# Patient Record
Sex: FEMALE | Race: OTHER | Employment: UNEMPLOYED | URBAN - METROPOLITAN AREA
[De-identification: names, ages, dates, MRNs, and addresses within clinical notes are randomized per-mention and may not be internally consistent; named-entity substitution may affect disease eponyms.]

---

## 2020-10-27 ENCOUNTER — TELEPHONE (OUTPATIENT)
Dept: FAMILY MEDICINE CLINIC | Age: 30
End: 2020-10-27

## 2020-10-27 NOTE — TELEPHONE ENCOUNTER
Patient called in the office wanting a appointment for pregnancy. LMP: 07/12/20. Patient is a NP here at the office, scheduled first telemed visit with  11/3 @ 9:00am   Patient denied any covid symtoms.

## 2020-11-03 ENCOUNTER — VIRTUAL VISIT (OUTPATIENT)
Dept: FAMILY MEDICINE CLINIC | Age: 30
End: 2020-11-03

## 2020-11-03 DIAGNOSIS — R35.0 URINARY FREQUENCY: ICD-10-CM

## 2020-11-03 DIAGNOSIS — N92.6 MISSED MENSES: Primary | ICD-10-CM

## 2020-11-03 PROBLEM — Z34.90 PREGNANCY: Status: ACTIVE | Noted: 2020-11-03

## 2020-11-03 PROCEDURE — 99213 OFFICE O/P EST LOW 20 MIN: CPT | Performed by: STUDENT IN AN ORGANIZED HEALTH CARE EDUCATION/TRAINING PROGRAM

## 2020-11-03 NOTE — PROGRESS NOTES
Benita Goss  31 y.o. female  1990  [id number]  [facility] [address]  [id number]   [address]:    Telemedicine Progress Note  Pratibha Barrera MD       Encounter Date and Time: November 3, 2020 at 8:48 AM    Consent:  She and/or the health care decision maker is aware that that she may receive a bill for this telephone service, depending on her insurance coverage, and has provided verbal consent to proceed: Yes    Chief Complaint   Patient presents with    Missed Menses     History of Present Illness   Benita Goss is a 31 y.o. female was evaluated by telephone from home, with the assistance of [employer] interpretor #[id number]    [facility] 2020    [id number]    G1: miscarriage at 5 weeks in   G2: miscarriage at 9 weeks,  (per chart review)  G3: LTFI via , 2015 at [facility] no additional interventions based on previous miscarriage history. Delivered at [facility]  G4: current    No hx of HTN, preE, DM, GDM    Denies vaginal bleeding, abdominal pain but states she has a feeling of \"pressure in the vagina with a feeling like you have when needing to urinate frequently\"; denies pain, burning with urinating    Has noticed a change in her vaginal discharge - more yellow and thicker with a mild, foul smell since being pregnant    Review of Systems   Review of Systems   Gastrointestinal: Positive for nausea. Negative for abdominal pain and vomiting. Genitourinary: Positive for frequency. Negative for dysuria, hematuria and urgency.        Patient Screening for COVID-19:     1) Patient denies complaints of shortness of breath or difficulty breathing, sore throat, chills, fatigue, muscle aches, loss of taste or smell, or GI symptoms(nausea, vomiting or diarrhea): Yes    2) Patient denies complaints of cough or fever over 100F: Yes    3) Patient denies leaving the country in the past 14 days or any other recent travel: Yes    4) Patient denies being exposed to anyone with COVID-19 and has not been around any one that has been sick with COVID-19 like symptoms as listed above: Yes     5) Patient informed to wear mask when arriving for appointment: Yes      Vitals/Objective:   General: Patient speaking in complete sentences without effort. Normal speech and cooperative. Due to this being a Virtual Check-in/Telephone evaluation, many elements of the physical examination are unable to be assessed. Assessment and Plan:   32 yo [id number] at 20LE4C by certain LMP of 2/63/1686 [facility] 4/18/2021). Sx discussed today c/f UTI vs BV/Trich/Yeast so scheduled for 11/4/2020 8am for acute visit and chart forwarded to Sonja Sever, LPN for scheduling IPN visit. 1. Missed menses  - Continue with PNV  - initial OB appt TBD  - dating with M for anatomy scan vs clinic US    2. Urinary frequency  - scheduled for acute care within 24 hours for UA, Pelvic    Time spent in direct conversation with the patient to include medical condition(s) discussed, assessment and treatment plan:       We discussed the expected course, resolution and complications of the diagnosis(es) in detail. Medication risks, benefits, costs, interactions, and alternatives were discussed as indicated. I advised her to contact the office if her condition worsens, changes or fails to improve as anticipated. She expressed understanding with the diagnosis(es) and plan. Patient understands that this encounter was a temporary measure, and the importance of further follow up and examination was emphasized. Patient verbalized understanding. Patient informed to follow up: Follow-up and Dispositions  ·   Return in about 1 day (around 11/4/2020), or 8am for UTI/Pelvic exam.         Electronically Signed: Cindy Sosa MD    Providers location when delivering service: home    CPT Codes 63433-82854 for Established Patients may apply to this Telehealth Visit. POS code: 18.   Modifier GT      Pursuant to the emergency declaration under the [facility], 5376 waiver authority and the [employer] Act, this Virtual  Visit was conducted, with patient's consent, to reduce the patient's risk of exposure to COVID-19 and provide continuity of care for an established patient. Services were provided through a video synchronous discussion virtually to substitute for in-person clinic visit. History   Patients past medical, surgical and family histories were reviewed and updated. No past medical history on file. Past Surgical History:   Procedure Laterality Date    BREAST SURGERY PROCEDURE UNLISTED      HX CHOLECYSTECTOMY       No family history on file.   Social History     Socioeconomic History    Marital status: SINGLE     Spouse name: Not on file    Number of children: Not on file    Years of education: Not on file    Highest education level: Not on file   Occupational History    Not on file   Social Needs    Financial resource strain: Not on file    Food insecurity     Worry: Not on file     Inability: Not on file    Transportation needs     Medical: Not on file     Non-medical: Not on file   Tobacco Use    Smoking status: Never Smoker   Substance and Sexual Activity    Alcohol use: Yes     Comment: social    Drug use: Not on file    Sexual activity: Not on file   Lifestyle    Physical activity     Days per week: Not on file     Minutes per session: Not on file    Stress: Not on file   Relationships    Social connections     Talks on phone: Not on file     Gets together: Not on file     Attends Jewish service: Not on file     Active member of club or organization: Not on file     Attends meetings of clubs or organizations: Not on file     Relationship status: Not on file    Intimate partner violence     Fear of current or ex partner: Not on file     Emotionally abused: Not on file     Physically abused: Not on file     Forced sexual activity: Not on file   Other Topics Concern    Not on file   Social History Narrative    Not on file     Patient Active Problem List   Diagnosis Code    Pregnancy Z34.90          Current Medications/Allergies   Medications and Allergies reviewed:      No Known Allergies

## 2020-11-04 ENCOUNTER — HOSPITAL ENCOUNTER (OUTPATIENT)
Dept: LAB | Age: 30
Discharge: HOME | End: 2020-11-04

## 2020-11-04 ENCOUNTER — OFFICE VISIT (OUTPATIENT)
Dept: FAMILY MEDICINE CLINIC | Age: 30
End: 2020-11-04

## 2020-11-04 VITALS
TEMPERATURE: 98.1 F | SYSTOLIC BLOOD PRESSURE: 107 MMHG | BODY MASS INDEX: 22.37 KG/M2 | HEART RATE: 78 BPM | WEIGHT: 138.6 LBS | OXYGEN SATURATION: 99 % | DIASTOLIC BLOOD PRESSURE: 68 MMHG

## 2020-11-04 DIAGNOSIS — B96.89 BACTERIAL VAGINOSIS IN PREGNANCY: Primary | ICD-10-CM

## 2020-11-04 DIAGNOSIS — R35.0 URINARY FREQUENCY: ICD-10-CM

## 2020-11-04 DIAGNOSIS — Z3A.16 16 WEEKS GESTATION OF PREGNANCY: ICD-10-CM

## 2020-11-04 DIAGNOSIS — O23.599 BACTERIAL VAGINOSIS IN PREGNANCY: Primary | ICD-10-CM

## 2020-11-04 LAB
BILIRUB UR QL STRIP: NEGATIVE
GLUCOSE UR-MCNC: NEGATIVE MG/DL
KETONES P FAST UR STRIP-MCNC: NEGATIVE MG/DL
PH UR STRIP: 7.5 [PH] (ref 4.6–8)
PROT UR QL STRIP: NEGATIVE
SP GR UR STRIP: 1.01 (ref 1–1.03)
UA UROBILINOGEN AMB POC: NORMAL (ref 0.2–1)
URINALYSIS CLARITY POC: CLEAR
URINALYSIS COLOR POC: YELLOW
URINE BLOOD POC: NEGATIVE
URINE LEUKOCYTES POC: NORMAL
URINE NITRITES POC: NEGATIVE

## 2020-11-04 PROCEDURE — 90471 IMMUNIZATION ADMIN: CPT

## 2020-11-04 PROCEDURE — 81003 URINALYSIS AUTO W/O SCOPE: CPT | Performed by: STUDENT IN AN ORGANIZED HEALTH CARE EDUCATION/TRAINING PROGRAM

## 2020-11-04 PROCEDURE — 87624 HPV HI-RISK TYP POOLED RSLT: CPT

## 2020-11-04 PROCEDURE — 99214 OFFICE O/P EST MOD 30 MIN: CPT | Performed by: STUDENT IN AN ORGANIZED HEALTH CARE EDUCATION/TRAINING PROGRAM

## 2020-11-04 PROCEDURE — 87661 TRICHOMONAS VAGINALIS AMPLIF: CPT

## 2020-11-04 PROCEDURE — 90686 IIV4 VACC NO PRSV 0.5 ML IM: CPT

## 2020-11-04 PROCEDURE — 88175 CYTOPATH C/V AUTO FLUID REDO: CPT

## 2020-11-04 RX ORDER — METRONIDAZOLE 7.5 MG/G
1 GEL VAGINAL
Qty: 25 G | Refills: 0 | Status: SHIPPED | OUTPATIENT
Start: 2020-11-04 | End: 2020-11-09

## 2020-11-04 NOTE — PROGRESS NOTES
Chief Complaint   Patient presents with    Pelvic Pain     vaginal discharge [id number]. yellowish color. +Odor. Some pressure when urinating. Visit Vitals  /68 (BP 1 Location: Left arm, BP Patient Position: Sitting)   Pulse 78   Temp 98.1 °F (36.7 °C) (Temporal)   Wt 138 lb 9.6 oz (62.9 kg)   SpO2 99%   BMI 22.37 kg/m²     1. Have you been to the ER, urgent care clinic since your last visit? Hospitalized since your last visit? No    2. Have you seen or consulted any other health care providers outside of the [address] [place] [address] since your last visit? Include any pap smears or colon screening.  No

## 2020-11-04 NOTE — PATIENT INSTRUCTIONS
Semanas 14 a 18 de vang embarazo: Instrucciones de cuidado Weeks 14 to 18 of Your Pregnancy: Care Instructions Instrucciones de cuidado Ruddy Dean, es posible que se le empiece a notar que está [place]. También podría observar algunos cambios en la piel, mesha picazón en algunas zonas de las kathryn de las destiny o acné en la hyun. Summer Handler, vang bebé puede orinar y noemi primeras heces (meconio) comienzan a acumularse en el intestino. Georgette Decree a crecerle el lana en la randy. En vang próxima visita, [employer] 18 y 21, vang médico podría hacerle tim ecografía. La prueba le permite al médico verificar si hay ciertos problemas. Vang médico también puede determinar el sexo de vang bebé. Bernie es un buen momento para pensar si Ebb Espinoza si vang bebé es Dorethea Door. Hable con vang médico acerca de ponerse la vacuna contra la gripe para ayudar a mantenerse angel ruddy el embarazo. Con el transcurrir del [facility] [place], es común sentirse preocupada o ansiosa. Vang cuerpo está [employer] Y usted está pensando en vickie a cyrus, en la mirta de vang bebé y en convertirse en madre. Puede aprender a sobrellevar la ansiedad y el estrés que siente. La atención de seguimiento es tim parte clave de vang tratamiento y seguridad. Asegúrese de hacer y acudir a todas las citas, y llame a vang médico si está teniendo problemas. También es tim buena idea saber los resultados de noemi exámenes y mantener tim lista de los medicamentos que kathie. Cómo puede cuidarse en el hogar? Reduzca el estrés 
  · Pida ayuda para cocinar y hacer los quehaceres domésticos.  
  · Entienda quién o qué le provoca estrés. Evite a estas personas o situaciones tanto mesha le sea posible.  
  · Relájese todos los días. Tomarse descansos de 10 a 15 minutos puede hacerle sentir tim gran diferencia.  Camine, escuche música o tome un baño tibio.  
  · Alston clases de yoga o educación prenatal para aprender técnicas de relajación. También puede comprar un disco compacto de relajación.  
  · Deven tim lista de noemi temores acerca de tener el bebé y ser [place]. Comparta la lista con alguien de vang confianza. Hope [employer] inquietudes son verdaderamente pequeñas, y trate de deshacerse de ellas. Ejercicio 
  · Si no hizo mucho ejercicio antes del embarazo, comience poco a poco. Lo mejor es caminar. Regule vang ritmo y deven un poco más cada día.  
  · La caminata rápida, el trote lento, los ejercicios aeróbicos de bajo impacto, los ejercicios aeróbicos en el agua y el yoga son [place] opciones. Algunos deportes, mesha el buceo, la equitación, el esquí Teresa, la gimnasia y el esquí acuático no son Grace Pollack idea.  
  · Trate de hacer por lo menos 2½ horas de ejercicio moderado a la semana, mesha, por ejemplo, tim caminata rápida. Anice Marc de hacer esto es estar activo 30 minutos al día, por lo menos 5 días de la [place]. Está brandan estar activo en bloques de 10 minutos o más goyo el día y la semana.  
  · Use ropa holgada. Use zapatos y un sostén que le proporcionen un buen soporte.  
  · Elta [place] de calentamiento y enfriamiento para comenzar y finalizar noemi ejercicios.  
  · Si desea usar pesas, asegúrese de que chris livianas. Estas reducen la tensión en las articulaciones. Manténgase en el peso ideal para usted 
  · Los expertos recomiendan el aumento de 1 hermann (medio kilo) al mes goyo los 3 primeros meses del embarazo.  
  · También recomiendan aumentar 1 hermann a la [employer] 6 últimos meses del [facility], para aumentar de 25 a 35 libras (11 a 16 kg) en total.  
  · Si está por debajo del peso recomendable para usted, necesitará aumentar más, de 28 a 40 libras (13 a 18 kg) aproximadamente.  
  · Si tiene sobrepeso, quizás no deba aumentar tanto de [place], de 15 a 25 libras (7 a 11 kg) aproximadamente.  
  · Si está subiendo de Ray Stein, use vang sentido común.  Cheikh Spangler ejercicio todos los días, y [employer], la comida rápida y [place]. Ghislaine Hackett, josetas y verduras.  
  · Si va a tener gemelos o más bebés, es posible que torres médico la remita a un dietista. Dónde puede encontrar más información en inglés? Gaurang Branham a http://www.mack.com/ Mansi Gutierrez Y187 en la búsqueda para aprender más acerca de \"Semanas 14 a 18 de torres embarazo: Instrucciones de cuidado. \" Revisado: 11 febrero, 2020               Versión del contenido: 12.6 © 3485-8625 Healthwise, Incorporated. Las instrucciones de cuidado fueron adaptadas bajo licencia por Good [employer] Connections (which disclaims liability or warranty for this information). Si usted tiene [place] afección médica o sobre estas instrucciones, siempre pregunte a torres profesional de mirta. Healthwise, Incorporated niega toda garantía o responsabilidad por torres uso de esta información. Aprenda cuándo llamar a torres médico goyo el embarazo ([address] 20 semanas) Learning About When to Call Your Doctor During Pregnancy (Up to 20 Weeks) Instrucciones de cuidado Es normal que tenga inquietudes acerca de lo que podría ser un problema goyo el [facility]. Aunque la mayoría de las mujeres embarazadas no tienen ningún problema grave, es importante saber cuándo llamar a torres médico si tiene determinados síntomas. Estas son algunas sugerencias generales. Torres médico puede darle más información sobre cuándo llamar. Cuándo llamar a torres médico ([address] 20 semanas) Llame al 911 en cualquier momento que sospeche que puede necesitar atención de [place]. Por ejemplo, llame si: 
· Se desmayó (perdió el conocimiento). Llame a torres médico ahora mismo o busque atención médica inmediata si: · Tiene fiebre. · Tiene sangrado vaginal. 
· Está mareada o aturdida, o siente que puede desmayarse. · Tiene síntomas de tim infección del tracto urinario. Estos pueden incluir: ? Dolor o ardor al orinar. ? Necesidad de orinar con frecuencia sin poder Excell Patricia. ? Dolor en el flanco, que se encuentra manjit debajo de la caja torácica y [place] de la cintura en un lado de la espalda. ? [employer]. · Tiene dolor abdominal. 
· Hermila que está teniendo contracciones. · Tiene tim pérdida repentina de líquido por la vagina. Preste especial atención a los cambios en vang mirta y asegúrese de comunicarse con vang médico si: · Tiene flujo vaginal con un olor desagradable. · Tiene otras inquietudes acerca de vang embarazo. La atención de seguimiento es tim parte clave de vang tratamiento y seguridad. Asegúrese de hacer y acudir a todas las citas, y llame a vang médico si está teniendo problemas. También es tim buena idea saber los resultados de noemi exámenes y mantener tim lista de los medicamentos que kathie. Dónde puede encontrar más información en inglés? Kylah Beyer a http://www.gray.com/ Renato O532 en la búsqueda para aprender más acerca de \"Aprenda cuándo llamar a vang médico goyo el embarazo ([address] 20 semanas). \" 
Revisado: 11 febrero, 2020               Versión del contenido: 12.6 © 5541-9558 Healthwise, Incorporated. Las instrucciones de cuidado fueron adaptadas bajo licencia por Good [employer] Connections (which disclaims liability or warranty for this information). Si usted tiene [place] afección médica o sobre estas instrucciones, siempre pregunte a vang profesional de mirta. Healthwise, Incorporated niega toda garantía o responsabilidad por vang uso de esta información. Vaginosis bacteriana: Instrucciones de cuidado Bacterial Vaginosis: Care Instructions Instrucciones de cuidado La vaginosis bacteriana es un tipo de infección vaginal. Es causada por el crecimiento excesivo de ciertas bacterias que se encuentran normalmente en la vagina.  Entre los síntomas se incluyen comezón, hinchazón, dolor al orinar o Elizebeth Brush, y flujo grisáceo o amarillento con olor a pescado. No se considera tim infección que se transmita por contacto sexual. 
Aunque los síntomas pueden ser molestos e incómodos, la vaginosis bacteriana no suele causar otros problemas de [place]. Sin embargo, puede causar complicaciones si la tiene mientras está [place]. Si brandan la infección podría desaparecer por sí [place], la mayoría de los médicos utilizan antibióticos para vang tratamiento. Es posible que le hayan recetado pastillas o cremas vaginales. Con tratamiento, la vaginosis bacteriana suele desaparecer al cabo de 5 a 7 días. La atención de seguimiento es tim parte clave de vang tratamiento y seguridad. Asegúrese de hacer y acudir a todas las citas, y llame a vang médico si está teniendo problemas. También es tim buena idea saber los resultados de noemi exámenes y mantener tim lista de los medicamentos que kathie. Cómo puede cuidarse en el hogar? · [address] No deje de tomarlos por el hecho de sentirse mejor. Debe perla todos los antibióticos hasta terminarlos. · Si está tomando metronidazol (Flagyl), no coma ni dario nada que contenga alcohol. · Siga utilizando los medicamentos aunque comience el período menstrual. Utilice toallas sanitarias en lugar de tampones goyo el tiempo que utilice la crema vaginal o supositorios. Los tampones pueden absorber el medicamento. · Use ropa holgada de algodón. No utilice nylon ni otros materiales que conserven el calor corporal y la humedad cerca de la piel. · No se rasque. Alivie la comezón con tim compresa fría o un baño frío. · No se lave la hoa vaginal más de tim vez al día. Use solo agua corriente o un Brie Beat y sin perfume. No use lavados vaginales. Cuándo debe pedir ayuda? Preste especial atención a los cambios en vang mirta y asegúrese de comunicarse con vang médico si: 
  · Tiene sangrado vaginal inesperado.  
  · Tiene fiebre.   · Tiene mayor dolor o dolor nuevo en la vagina o la pelvis.  
  · No mejora después de 1 semana.  
  · Charito síntomas regresan después de que termina charito medicamentos. Dónde puede encontrar más información en inglés? Magno Sumnerve a http://www.gray.Ippies/ Renato V209 en la búsqueda para aprender más acerca de \"Vaginosis bacteriana: Instrucciones de cuidado. \" Revisado: 8 noviembre, 2019               Versión del contenido: 12.6 © 2460-2624 Rayn, [employer]. Las instrucciones de cuidado fueron adaptadas bajo licencia por Good Help Connections (which disclaims liability or warranty for this information). Si usted tiene [place] afección médica o sobre estas instrucciones, siempre pregunte a vang profesional de mirta. Rayn, [employer] niega toda garantía o responsabilidad por vang uso de esta información.

## 2020-11-04 NOTE — PROGRESS NOTES
Chief Complaint: urinary pain and vaginal discharge with odor  Source: self    Subjective  Dangelo Giordano is an 31 y.o. female who presents for pain with urination and increased urinary frequency. She also notes having increased yellow vaginal discharge with foul odor. She denies fever, chills, nausea, vomiting, diarrhea. She is 16wk3d pregnant by LMP      Allergies - reviewed:   No Known Allergies      Medications - reviewed:   Current Outpatient Medications   Medication Sig    prenatal multivit-ca-min-fe-fa tab Take  by mouth.  metroNIDAZOLE (METROGEL) 0.75 % gel Insert 5 g into vagina nightly for 5 days. No current facility-administered medications for this visit. Past Medical History - reviewed:  No past medical history on file.       Past Surgical History - reviewed:   Past Surgical History:   Procedure Laterality Date    BREAST SURGERY PROCEDURE UNLISTED      HX CHOLECYSTECTOMY           Social History - reviewed:  Social History     Socioeconomic History    Marital status: SINGLE     Spouse name: Not on file    Number of children: Not on file    Years of education: Not on file    Highest education level: Not on file   Occupational History    Not on file   Social Needs    Financial resource strain: Not on file    Food insecurity     Worry: Not on file     Inability: Not on file   [employer] needs     Medical: Not on file     Non-medical: Not on file   Tobacco Use    Smoking status: Never Smoker    Smokeless tobacco: Never Used   Substance and Sexual Activity    Alcohol use: Never     Frequency: Never     Comment: social    Drug use: Never    Sexual activity: Yes     Partners: Female     Birth control/protection: Pill   Lifestyle    Physical activity     Days per week: Not on file     Minutes per session: Not on file    Stress: Not on file   Relationships    Social connections     Talks on phone: Not on file     Gets together: Not on file     Attends Sabianism service: Not on file     Active member of club or organization: Not on file     Attends meetings of clubs or organizations: Not on file     Relationship status: Not on file    Intimate partner violence     Fear of current or ex partner: Not on file     Emotionally abused: Not on file     Physically abused: Not on file     Forced sexual activity: Not on file   Other Topics Concern    Not on file   Social History Narrative    Not on file         Family History - reviewed:  Family History   Problem Relation Age of Onset    Diabetes Mother          Immunizations - reviewed: There is no immunization history for the selected administration types on file for this patient. Flu: Today    Review of Systems   Constitutional: Negative for chills and fever. Respiratory: Negative for cough and shortness of breath. Cardiovascular: Negative for palpitations. Gastrointestinal: Negative for abdominal pain, constipation, diarrhea, nausea and vomiting. Genitourinary: Positive for dysuria and frequency. + vaginal discharge         Physical Exam  Visit Vitals  /68 (BP 1 Location: Left arm, BP Patient Position: Sitting)   Pulse 78   Temp 98.1 °F (36.7 °C) (Temporal)   Wt 138 lb 9.6 oz (62.9 kg)   LMP 07/12/2020   SpO2 99%   BMI 22.37 kg/m²       Physical Exam  Exam conducted with a chaperone present. Constitutional:       Appearance: Normal appearance. She is normal weight. Genitourinary:     General: Normal vulva. Exam position: Lithotomy position. Pubic Area: No rash. Labia:         Right: No rash, tenderness, lesion or injury. Left: No rash, tenderness, lesion or injury. Comments: Cervix with scant yellow discharge, negative whiff test. Minimal bleeding when pap collected. Transformation zone easily identified. Cervix closed. No CMT. Uterus c/w 16wk gestation on bimanual exam  Neurological:      Mental Status: She is alert.          Recent Results (from the past 12 hour(s))   AMB POC URINALYSIS DIP STICK AUTO W/O MICRO    Collection Time: 11/04/20  8:38 AM   Result Value Ref Range    Color (UA POC) Yellow     Clarity (UA POC) Clear     Glucose (UA POC) Negative Negative    Bilirubin (UA POC) Negative Negative    Ketones (UA POC) Negative Negative    Specific gravity (UA POC) 1.015 1.001 - 1.035    Blood (UA POC) Negative Negative    pH (UA POC) 7.5 4.6 - 8.0    Protein (UA POC) Negative Negative    Urobilinogen (UA POC) 0.2 mg/dL 0.2 - 1    Nitrites (UA POC) Negative Negative    Leukocyte esterase (UA POC) Trace Negative     Wet Prep: + clue cells    Assessment/Plan    ICD-10-CM ICD-9-CM    1. Bacterial vaginosis in pregnancy  O23.599 648.90 metroNIDAZOLE (METROGEL) 0.75 % gel    B96.89 616.10    2. Urinary frequency  R35.0 788.41 AMB POC URINALYSIS DIP STICK AUTO W/O MICRO      CULTURE, URINE      CULTURE, URINE   3. 16 weeks gestation of pregnancy  Z3A.16 V22.2 PAP IG, CT-NG-TV, APTIMA HPV AND RFX 64/02,54([id number])      INFLUENZA VIRUS VAC QUAD,SPLIT,PRESV FREE SYRINGE IM       Taya Ortiz is an 31 y.o. female at 13wk3d presenting for symptoms of UTI vs pelvic infection. UA unremarkable however wet prep with clue cells so will treat empirically for BV given symptoms. G/C and PAP collected today so no need to repeat at initial prenatal. Has initial prenatal scheduled for 11/9/2020 and can followup on symptoms then. 1. Urinary frequency  - AMB POC URINALYSIS DIP STICK AUTO W/O MICRO  - CULTURE, URINE; Future  - CULTURE, URINE    2. 16 weeks gestation of pregnancy  - PAP IG, CT-NG-TV, APTIMA HPV AND RFX 29/70,76([id number]); Future  - INFLUENZA VIRUS VAC QUAD,SPLIT,PRESV FREE SYRINGE IM    3. Bacterial vaginosis in pregnancy  - metroNIDAZOLE (METROGEL) 0.75 % gel; Insert 5 g into vagina nightly for 5 days. Dispense: 25 g; Refill: 0      I have discussed the diagnosis with the patient and the intended plan as seen in the above orders.  Patient verbalized understanding of the plan and agrees with the plan. The patient has received an after-visit summary and questions were answered concerning future plans. I have discussed medication side effects and warnings with the patient as well. Informed patient to return to the office if new symptoms arise. Patient discussed with Dr. Shaheen Medina, supervising physician.     Josh Linda PGY2  Family Medicine Resident

## 2020-11-04 NOTE — Clinical Note
Just CC'ing you on chart for initial prenatal you have on 11/9. I did a pelvic and pap/g/c + UA/Cx (its negative) + flu shot was given this week so no need to repeat the pelvic. Treated her for BV but if you could see if her Sx have improved when you seen her Monday.

## 2020-11-05 LAB
BACTERIA SPEC CULT: NORMAL
SERVICE CMNT-IMP: NORMAL

## 2020-11-06 LAB
C TRACH RRNA SPEC QL NAA+PROBE: NEGATIVE
N GONORRHOEA RRNA SPEC QL NAA+PROBE: NEGATIVE
SPECIMEN SOURCE: NORMAL
T VAGINALIS RRNA SPEC QL NAA+PROBE: NEGATIVE

## 2020-11-06 NOTE — PROGRESS NOTES
[address] Medicine Residency Attending Addendum:  Dr. Ashwini Zuleta MD,  the patient and I were not physically present during this encounter. The resident and I are concurrently monitoring the patient care through appropriate telecommunication technology. I discussed the findings, assessment and plan with the resident and agree with the resident's findings and plan as documented in the resident's note.       Laura Leo MD

## 2020-11-09 ENCOUNTER — INITIAL PRENATAL (OUTPATIENT)
Dept: FAMILY MEDICINE CLINIC | Age: 30
End: 2020-11-09

## 2020-11-09 VITALS
BODY MASS INDEX: 21.79 KG/M2 | SYSTOLIC BLOOD PRESSURE: 111 MMHG | TEMPERATURE: 98.2 F | HEART RATE: 96 BPM | OXYGEN SATURATION: 96 % | HEIGHT: 66 IN | WEIGHT: 135.6 LBS | RESPIRATION RATE: 16 BRPM | DIASTOLIC BLOOD PRESSURE: 69 MMHG

## 2020-11-09 DIAGNOSIS — Z3A.17 17 WEEKS GESTATION OF PREGNANCY: Primary | ICD-10-CM

## 2020-11-09 LAB
BILIRUB UR QL STRIP: NEGATIVE
ERYTHROCYTE [DISTWIDTH] IN BLOOD BY AUTOMATED COUNT: 13.8 % (ref 11.5–14.5)
GLUCOSE UR-MCNC: NEGATIVE MG/DL
HCG URINE, QL. (POC): POSITIVE
HCT VFR BLD AUTO: 42.6 % (ref 35–47)
HGB BLD-MCNC: 14.5 G/DL (ref 11.5–16)
KETONES P FAST UR STRIP-MCNC: NEGATIVE MG/DL
MCH RBC QN AUTO: 29.5 PG (ref 26–34)
MCHC RBC AUTO-ENTMCNC: 34 G/DL (ref 30–36.5)
MCV RBC AUTO: 86.8 FL (ref 80–99)
NRBC # BLD: 0 K/UL (ref 0–0.01)
NRBC BLD-RTO: 0 PER 100 WBC
PH UR STRIP: 6 [PH] (ref 4.6–8)
PLATELET # BLD AUTO: 183 K/UL (ref 150–400)
PMV BLD AUTO: 11.6 FL (ref 8.9–12.9)
PROT UR QL STRIP: NORMAL
RBC # BLD AUTO: 4.91 M/UL (ref 3.8–5.2)
SP GR UR STRIP: 1.03 (ref 1–1.03)
UA UROBILINOGEN AMB POC: NORMAL (ref 0.2–1)
URINALYSIS CLARITY POC: NORMAL
URINALYSIS COLOR POC: YELLOW
URINE BLOOD POC: NEGATIVE
URINE LEUKOCYTES POC: NEGATIVE
URINE NITRITES POC: NEGATIVE
VALID INTERNAL CONTROL?: YES
WBC # BLD AUTO: 9.2 K/UL (ref 3.6–11)

## 2020-11-09 PROCEDURE — 81025 URINE PREGNANCY TEST: CPT | Performed by: STUDENT IN AN ORGANIZED HEALTH CARE EDUCATION/TRAINING PROGRAM

## 2020-11-09 PROCEDURE — 0502F SUBSEQUENT PRENATAL CARE: CPT | Performed by: STUDENT IN AN ORGANIZED HEALTH CARE EDUCATION/TRAINING PROGRAM

## 2020-11-09 NOTE — PROGRESS NOTES
Subjective:   Johanne Diaz is a 31 y.o.  at 17w2d with RAFY of Estimated Date of Delivery: 21 based on LMP, who is being seen today for her first initial obstetrical visit. This is a planned pregnancy. Father of baby present. Happy about pregnancy. LMP: 20. See flow sheet for OB history. History of GDM or DM? no  History of GHTN or HTN? no  History of pre-eclampsia? no  History of Genetic disorders? no  History of depression?: no   Taking prenatal vitamins? Yes    Patient being treated for BV starting 20. Patient states that symptoms have improved. Denies fever, chills, vomiting, vaginal bleeding, vaginal discharge, contractions. Allergies- reviewed:   No Known Allergies    Medications- reviewed:   Current Outpatient Medications   Medication Sig    prenatal multivit-ca-min-fe-fa tab Take  by mouth.  metroNIDAZOLE (METROGEL) 0.75 % gel Insert 5 g into vagina nightly for 5 days. No current facility-administered medications for this visit. Past Medical History- reviewed:  History reviewed. No pertinent past medical history.     Past Surgical History- reviewed:   Past Surgical History:   Procedure Laterality Date    BREAST SURGERY PROCEDURE UNLISTED      HX CHOLECYSTECTOMY         Social History- reviewed:  Social History     Socioeconomic History    Marital status: SINGLE     Spouse name: Not on file    Number of children: Not on file    Years of education: Not on file    Highest education level: Not on file   Occupational History    Not on file   Social Needs    Financial resource strain: Not on file    Food insecurity     Worry: Not on file     Inability: Not on file    Transportation needs     Medical: Not on file     Non-medical: Not on file   Tobacco Use    Smoking status: Never Smoker    Smokeless tobacco: Never Used   Substance and Sexual Activity    Alcohol use: Never     Frequency: Never     Comment: social    Drug use: Never    Sexual activity: Yes     Partners: Male     Birth control/protection: None   Lifestyle    Physical activity     Days per week: Not on file     Minutes per session: Not on file    Stress: Not on file   Relationships    Social connections     Talks on phone: Not on file     Gets together: Not on file     Attends [employer] service: Not on file     Active member of club or organization: Not on file     Attends meetings of clubs or organizations: Not on file     Relationship status: Not on file    Intimate partner violence     Fear of current or ex partner: Not on file     Emotionally abused: Not on file     Physically abused: Not on file     Forced sexual activity: Not on file   Other Topics Concern    Not on file   Social History Narrative    Not on file         Objective:     Visit Vitals  /69 (BP 1 Location: Left arm, BP Patient Position: Sitting)   Pulse 96   Temp 98.2 °F (36.8 °C) (Temporal)   Resp 16   Ht 5' 6\" (1.676 m)   Wt 135 lb 9.6 oz (61.5 kg)   LMP 07/12/2020   SpO2 96%   BMI 21.89 kg/m²       Labs:  Recent Results (from the past 12 hour(s))   AMB POC URINALYSIS DIP STICK AUTO W/O MICRO    Collection Time: 11/09/20 11:21 AM   Result Value Ref Range    Color (UA POC) Yellow     Clarity (UA POC) Slightly Cloudy     Glucose (UA POC) Negative Negative    Bilirubin (UA POC) Negative Negative    Ketones (UA POC) Negative Negative    Specific gravity (UA POC) 1.030 1.001 - 1.035    Blood (UA POC) Negative Negative    pH (UA POC) 6.0 4.6 - 8.0    Protein (UA POC) Trace Negative    Urobilinogen (UA POC) 0.2 mg/dL 0.2 - 1    Nitrites (UA POC) Negative Negative    Leukocyte esterase (UA POC) Negative Negative   AMB POC URINE PREGNANCY TEST, VISUAL COLOR COMPARISON    Collection Time: 11/09/20 11:21 AM   Result Value Ref Range    VALID INTERNAL CONTROL POC Yes     HCG urine, Ql. (POC) Positive Negative         Assessment and Plan:         ICD-10-CM ICD-9-CM    1. 17 weeks gestation of pregnancy  Z3A.17 V22.2 AMB POC URINALYSIS DIP STICK AUTO W/O MICRO      AMB POC URINE PREGNANCY TEST, VISUAL COLOR COMPARISON       31 y.o.  at 17w1d by LMP here for initial OB visit  - PNL: A+, GC/CT neg, ucx NG, Pap NILM HPV neg  - CBC, Hgb fractionation, Ab screen, rubella, VZV, HBsAg RPR, HIV collected   - Discussed recommended weight gain of 25-35lb  - Recommended prenatal vitamins  - Patient would like genetic screening. To be collected at next visit if not collected today. - Dating/Anatomy scan to be scheduled. Sandip Riley RN aware. - Follow up on 20    Orders Placed This Encounter    AMB POC URINALYSIS DIP STICK AUTO W/O MICRO    AMB POC URINE PREGNANCY TEST, VISUAL COLOR COMPARISON         I have discussed the diagnosis with the patient and the intended plan as seen in the above orders. The patient has received an after-visit summary and questions were answered concerning future plans. I have discussed medication side effects and warnings with the patient as well. Informed pt to return to the office or go to the ER if she experiences vaginal bleeding, vaginal discharge, leaking of fluid, pelvic cramping.       Pt seen and discussed with Dr. Jessenia Johns (attending physician)    Panda Wolfe DO  Family Medicine Resident

## 2020-11-09 NOTE — PROGRESS NOTES
Lucía Joseph is a 31 y.o. female    Chief Complaint   Patient presents with    Initial Prenatal Visit     Patient is coming in for her initial prenatal visit. She is 17 weeks and 1 day,. She said she took 3 at home pregnancy tests and the first one was negative but the last 2 were positive. She is not having vaginal bleeding or discharge. She is not hvaing contractions. She said she does feel the baby move. No other contractions. 1. Have you been to the ER, urgent care clinic since your last visit? Hospitalized since your last visit? No  M  2. Have you seen or consulted any other health care providers outside of the [address] [place] [address] since your last visit? Include any pap smears or colon screening. No      Visit Vitals  /69 (BP 1 Location: Left arm, BP Patient Position: Sitting)   Pulse 96   Temp 98.2 °F (36.8 °C) (Temporal)   Resp 16   Ht 5' 6\" (1.676 m)   Wt 135 lb 9.6 oz (61.5 kg)   SpO2 96%   BMI 21.89 kg/m²           Health Maintenance Due   Topic Date Due    DTaP/Tdap/Td series (1 - Tdap) 03/15/2010    PAP AKA CERVICAL CYTOLOGY  03/15/2010         Medication Reconciliation completed, changes noted.   Please  Update medication list.    Vaginal Bleeding: No  Vaginal discharge: No  Fetal movement: Yes  Contractions: No  Prenatal Vitamins: yes

## 2020-11-10 LAB
BLOOD BANK CMNT PATIENT-IMP: NORMAL
BLOOD GROUP ANTIBODIES SERPL: NORMAL
HBV SURFACE AG SER QL: 0.18 INDEX
HBV SURFACE AG SER QL: NEGATIVE
HIV 1+2 AB+HIV1 P24 AG SERPL QL IA: NONREACTIVE
HIV12 RESULT COMMENT, HHIVC: NORMAL
RPR SER QL: NONREACTIVE
RUBV IGG SER-IMP: REACTIVE
RUBV IGG SERPL IA-ACNC: >500 IU/ML

## 2020-11-11 LAB
DEPRECATED HGB OTHER BLD-IMP: 0 %
HGB A MFR BLD: 97.2 % (ref 96.4–98.8)
HGB A2 MFR BLD COLUMN CHROM: 2.8 % (ref 1.8–3.2)
HGB C MFR BLD: 0 %
HGB F MFR BLD: 0 % (ref 0–2)
HGB FRACT BLD-IMP: NORMAL
HGB S BLD QL SOLY: NEGATIVE
HGB S MFR BLD: 0 %
VZV IGG SER IA-ACNC: >4000 INDEX

## 2020-11-13 NOTE — PROGRESS NOTES
A+, GC/CT neg, Ab screen neg, Hgb frc wnl, VZV/Rubella immune, RPR/HIV NR, HbsAg neg. Pap NILM HPV neg,  CBC wnl.

## 2020-11-22 ENCOUNTER — DOCUMENTATION ONLY (OUTPATIENT)
Dept: FAMILY MEDICINE CLINIC | Age: 30
End: 2020-11-22

## 2020-12-02 ENCOUNTER — ROUTINE PRENATAL (OUTPATIENT)
Dept: FAMILY MEDICINE CLINIC | Age: 30
End: 2020-12-02

## 2020-12-02 DIAGNOSIS — R42 LIGHT-HEADEDNESS: ICD-10-CM

## 2020-12-02 DIAGNOSIS — Z3A.20 20 WEEKS GESTATION OF PREGNANCY: Primary | ICD-10-CM

## 2020-12-02 PROCEDURE — 0502F SUBSEQUENT PRENATAL CARE: CPT | Performed by: STUDENT IN AN ORGANIZED HEALTH CARE EDUCATION/TRAINING PROGRAM

## 2020-12-02 NOTE — PROGRESS NOTES
Isela Wilson  31 y.o. female  1990  [address] [place] [address] Drive [address]  [id number]    [phone] (home)      [address]:    [facility] Telephone Encounter  Prachi Rivera MD       Encounter Date: 12/2/2020 at 8:39 AM    Consent:  She and/or the health care decision maker is aware that this encounter will be billed as a routine OB appointment and that she may receive a bill for this telephone service, depending on her insurance coverage, and has provided verbal consent to proceed: Yes    Follow-up Prenatal     History of Present Illness    #[id number]    Contractions: no  LOF: no  Vaginal bleeding: no  Fetal movement (if >20 wk): yes    Pt complains of occasional light-headedness before lunchtime when she stands up. She denies CP, palpitations, and SOB. She has never lost consciousness. This has been present since the start of her second trimester. It is occasional and is not worsening. Pt has been drinking 2 16oz water bottles each day, and this is all she has been drinking. These symptoms resolve after lunch. She does not experience it in the morning or at night. Vitals/Objective:   General: Patient speaking in complete sentences without effort. Normal speech and cooperative. Due to this being a Virtual Check-in/Telephone evaluation, many elements of the physical examination are unable to be assessed. Assessment and Plan:   Isela Wilson is a 31 y.o. G4 [id number] @ 20w3d evaluated by telephone. 1. SIUP - A, Rh pos, Abs neg, HepB/GC/chlam neg, Pap NILM, HPV neg, Hgb fract nml, HIV/RPR NR, VZV/rubella imm, hgb 14.5  - Genetic screening: NIPT low risk, female   - 20 wk scan: scheduled for 12/7  - Next appt on 12/21  - Will need GTT at 24-28 weeks w/ repeat CBC    2. Occasional light-headedness - Discussed physiologic cardiovascular changes when becoming pregnant. Pt is not taking in very much fluid.  She is consuming about 4 cups of water daily.  - Asked pt to increase water intake, especially in the morning.  - Discussed ensuring that she is eating regularly. She may need to eat lunch earlier given it resolves after lunch. - If symptoms get worse or become more persistent, pt will need to come into the office sooner for further work up. - Discussed that if pt ever experiences episode of syncope, SOB, persistent palpitations, or CP, she should go to the hospital immediately        -Patient informed of her next appointment: 12/21/2020 (in person)  -Advised to come in sooner for any concerns  -Patient was reminded about social distancing and to avoid going into public when possible. Patient understands that this encounter was a temporary measure, and the importance of further follow up and examination was emphasized. Patient verbalized understanding. I affirm this is a Patient Initiated Episode with an Established Patient who has not had a related appointment within my department in the past 7 days or scheduled within the next 24 hours. Note: not billable if this call serves to triage the patient into an appointment for the relevant concern      Electronically Signed: Livier Warner MD  Providers location when delivering service: office      ICD-10-CM ICD-9-CM    1. 20 weeks gestation of pregnancy  Z3A.20 V22.2    2. Light-headedness  R42 780.4        Pursuant to the emergency declaration under the [facility] waiver authority and the [address] and [employer] Act, this Virtual  Visit was conducted, with patient's consent, to reduce the patient's risk of exposure to COVID-19 and provide continuity of care for an established patient. History   Patients past medical, surgical and family histories were personally reviewed and updated.   yes    Patient Active Problem List   Diagnosis Code    Pregnancy Z34.90              Current Medications/Allergies   Medications and Allergies reviewed:    Current Outpatient Medications   Medication Sig Dispense Refill    prenatal multivit-ca-min-fe-fa tab Take  by mouth.        No Known Allergies

## 2020-12-07 ENCOUNTER — HOSPITAL ENCOUNTER (OUTPATIENT)
Dept: PERINATAL CARE | Age: 30
Discharge: HOME | End: 2020-12-07
Attending: OBSTETRICS & GYNECOLOGY

## 2020-12-07 PROCEDURE — 76805 OB US >/= 14 WKS SNGL FETUS: CPT | Performed by: OBSTETRICS & GYNECOLOGY

## 2020-12-21 ENCOUNTER — ROUTINE PRENATAL (OUTPATIENT)
Dept: FAMILY MEDICINE CLINIC | Age: 30
End: 2020-12-21

## 2020-12-21 ENCOUNTER — TELEPHONE (OUTPATIENT)
Dept: FAMILY MEDICINE CLINIC | Age: 30
End: 2020-12-21

## 2020-12-21 VITALS
HEART RATE: 88 BPM | BODY MASS INDEX: 23.11 KG/M2 | WEIGHT: 143.8 LBS | TEMPERATURE: 97.9 F | OXYGEN SATURATION: 98 % | HEIGHT: 66 IN | SYSTOLIC BLOOD PRESSURE: 95 MMHG | DIASTOLIC BLOOD PRESSURE: 61 MMHG

## 2020-12-21 DIAGNOSIS — Z3A.20 20 WEEKS GESTATION OF PREGNANCY: Primary | ICD-10-CM

## 2020-12-21 LAB
BILIRUB UR QL STRIP: NEGATIVE
GLUCOSE UR-MCNC: NEGATIVE MG/DL
KETONES P FAST UR STRIP-MCNC: NEGATIVE MG/DL
PH UR STRIP: 7 [PH] (ref 4.6–8)
PROT UR QL STRIP: NEGATIVE
SP GR UR STRIP: 1.02 (ref 1–1.03)
UA UROBILINOGEN AMB POC: NORMAL (ref 0.2–1)
URINALYSIS CLARITY POC: CLEAR
URINALYSIS COLOR POC: YELLOW
URINE BLOOD POC: NEGATIVE
URINE LEUKOCYTES POC: NEGATIVE
URINE NITRITES POC: NEGATIVE

## 2020-12-21 PROCEDURE — 0502F SUBSEQUENT PRENATAL CARE: CPT | Performed by: STUDENT IN AN ORGANIZED HEALTH CARE EDUCATION/TRAINING PROGRAM

## 2020-12-21 PROCEDURE — 81003 URINALYSIS AUTO W/O SCOPE: CPT | Performed by: STUDENT IN AN ORGANIZED HEALTH CARE EDUCATION/TRAINING PROGRAM

## 2020-12-21 NOTE — PROGRESS NOTES
Return OB Visit     Nellyt #[id number] used as Pt is Romanian speaking only     Subjective:   Kwesi Gordon 30 y.o.   RAFY: 2021, by Last Menstrual Period  GA:  20w3d. LOF: no  Vaginal bleeding: no  Fetal movement (after 20 weeks): yes  Contractions: no     Pt denies fever, chills, HA, vision disturbances, RUQ pain, chest pain, SOB, N/V/D, constipation, urinary problems, foul smelling vaginal discharge, LE Edema worse than usual.     OB History    Para Term  AB Living   4 1 1   2 1   SAB TAB Ectopic Molar Multiple Live Births   2         1      # Outcome Date GA Lbr Terrence/2nd Weight Sex Delivery Anes PTL Lv   4 Current            3 Term 12/30/15 39w3d  8 lb 4 oz (3.742 kg) F Vag-Spont EPI  MILLICENT   2 2014 11w5d          1 2010 7w0d              Allergies- reviewed:   No Known Allergies  Medications- reviewed:   Current Outpatient Medications   Medication Sig    prenatal multivit-ca-min-fe-fa tab Take  by mouth. No current facility-administered medications for this visit. Past Medical History- reviewed:  No past medical history on file.   Past Surgical History- reviewed:   Past Surgical History:   Procedure Laterality Date    BREAST SURGERY PROCEDURE UNLISTED      HX CHOLECYSTECTOMY       Social History- reviewed:  Social History     Socioeconomic History    Marital status: SINGLE     Spouse name: Not on file    Number of children: Not on file    Years of education: Not on file    Highest education level: Not on file   Occupational History    Not on file   Social Needs    Financial resource strain: Not on file    Food insecurity     Worry: Not on file     Inability: Not on file    Transportation needs     Medical: Not on file     Non-medical: Not on file   Tobacco Use    Smoking status: Never Smoker    Smokeless tobacco: Never Used   Substance and Sexual Activity    Alcohol use: Never     Frequency: Never     Comment: social    Drug use: Never    Sexual activity: Yes     Partners: Male     Birth control/protection: None   Lifestyle    Physical activity     Days per week: Not on file     Minutes per session: Not on file    Stress: Not on file   Relationships    Social connections     Talks on phone: Not on file     Gets together: Not on file     Attends Zoroastrianism service: Not on file     Active member of club or organization: Not on file     Attends meetings of clubs or organizations: Not on file     Relationship status: Not on file    Intimate partner violence     Fear of current or ex partner: Not on file     Emotionally abused: Not on file     Physically abused: Not on file     Forced sexual activity: Not on file   Other Topics Concern    Not on file   Social History Narrative    Not on file     Immunizations- reviewed:   Immunization History   Administered Date(s) Administered    Influenza Vaccine Zenytime) PF (>6 Mo Flulaval, Fluarix, and >3 Yrs Afluria, Fluzone 18113) 11/04/2020       Objective:     Visit Vitals  BP 95/61 (BP 1 Location: Left arm, BP Patient Position: Sitting)   Pulse 88   Temp 97.9 °F (36.6 °C) (Temporal)   Ht 5' 6\" (1.676 m)   Wt 143 lb 12.8 oz (65.2 kg)   LMP 07/12/2020 (Exact Date)   SpO2 98%   BMI 23.21 kg/m²       Physical Exam:  GENERAL APPEARANCE: alert, well appearing, in no apparent distress  ABDOMEN: gravid, Fundal height 21 FHT present at 150  bpm  PSYCH: normal mood and affect     Labs  Recent Results (from the past 12 hour(s))   AMB POC URINALYSIS DIP STICK AUTO W/O MICRO    Collection Time: 12/21/20  1:39 PM   Result Value Ref Range    Color (UA POC) Yellow     Clarity (UA POC) Clear     Glucose (UA POC) Negative Negative    Bilirubin (UA POC) Negative Negative    Ketones (UA POC) Negative Negative    Specific gravity (UA POC) 1.020 1.001 - 1.035    Blood (UA POC) Negative Negative    pH (UA POC) 7.0 4.6 - 8.0    Protein (UA POC) Negative Negative    Urobilinogen (UA POC) 0.2 mg/dL 0.2 - 1    Nitrites (UA POC) Negative Negative Leukocyte esterase (UA POC) Negative Negative     I personally reviewed POC UA- UA negative. No signs of infection. No evidence of proteinuria       Assessment         ICD-10-CM ICD-9-CM    1. 20 weeks gestation of pregnancy  Z3A.20 V22.2 AMB POC URINALYSIS DIP STICK AUTO W/O MICRO         Plan   31 y.o.  20w3d RAFY 2021, by 18w MFM scan here for return OB visit     1. SIUP at 20w3d  -PNL:A+, Rh pos, Abs neg, HepB/GC/chlam neg, Pap NILM, HPV neg, Hgb fract nml, HIV/RPR NR, VZV/rubella imm, hgb 14.5  -Genetic testing:NIPT low risk, female (patient knows the sex)  -Immunizations: s/p flu   -Ultrasound: 20 with MFM-Single viable IUP with biometry measurements NOT consistent with LMP is observed. EFW is appropriate for gestational age. Normal anatomy, ILYA and measurements. Her new EDC is 21.  -Family Planning- does not want to have more children. Interested in family planning and something to help with periods. She has been on OCP in past but wasn't consistent with taking a pill each day. She is interested in IUD. · Follow up in 4 weeks - discuss family planning agin with Pt. She is going to discuss IUD with . PREGNANCY CONCERNS    Lightheadedness: RESOLVED- Noted before lunch. Only drinking 32oz water per day. Not worsening. Intermittent. BIRTH PLAN  · Continuity Provider: [facility]  · Pain mgmt. in labor: epidural  · Feeding plan: both   · Social: Denies Tobacco, EtoH or illict drugs. Orders Placed This Encounter    AMB POC URINALYSIS DIP STICK AUTO W/O MICRO     Labor precautions discussed, including: Regular painful contractions, lasting for greater than one hour, taking your breath away; any vaginal bleeding; any leakage of fluid; or absent or decreased fetal movement. Call M.D. on call if any of these symptoms or signs occur. I have discussed the diagnosis with the patient and the intended plan as seen in the above orders.   The patient has received an after-visit summary and questions were answered concerning future plans. I have discussed medication side effects and warnings with the patient as well. Informed pt to return to the office or go to the ER if she experiences vaginal bleeding, vaginal discharge, leaking of fluid, pelvic cramping.     Pt seen and discussed with Dr. Taty Woodall (attending physician)    Diane Silva DO  Family Medicine Resident

## 2020-12-21 NOTE — PROGRESS NOTES
I reviewed with the resident the medical history and the resident's findings on the physical examination. I discussed with the resident the patient's diagnosis and concur with the plan. [facility] @ 20w3d by 18wk scan  1.   IUP: RH pos, NIPT wnl, normal anatomy     Interested in IUD

## 2020-12-21 NOTE — PATIENT INSTRUCTIONS
Semanas 18 a 22 de vang embarazo: Instrucciones de cuidado Weeks 18 to 22 of Your Pregnancy: Care Instructions Instrucciones de cuidado Vang bebé continúa desarrollándose rápidamente. En esta etapa, los bebés ya pueden chuparse el pulgar, agarrar firmemente con las [place], y abrir y cerrar los párpados. En algún [place]'s 18 y 25, comenzará a sentir que el bebé se [place]. Al principio, estos pequeños movimientos se sentirán mesha un aleteo o un vuelo de mariposas. Algunas mujeres dicen que sienten mesha burbujas de [place]. A medida que el bebé crece, estos movimientos serán más hector. También podría observar que vang bebé patea y tiene hipo. Goyo manisha [place], podría descubrir que las náuseas y la fatiga desaparecieron. En general, es posible que se sienta mejor y tenga más energía que la que tenía goyo el primer trimestre. Sin embargo, también podría tener nuevas [place], mesha problemas para dormir o calambres en las piernas. Esta hoja de cuidados la ayudará a aliviar esas molestias. La atención de seguimiento es tim parte clave de vang tratamiento y seguridad. Asegúrese de hacer y acudir a todas las citas, y llame a vang médico si está teniendo problemas. También es tim buena idea saber los resultados de noemi exámenes y mantener tim lista de los medicamentos que kathie. Cómo puede cuidarse en el hogar? Alivie los problemas para dormir · Evite la cafeína en las bebidas o los chocolates a última hora del día. · Deven algo de ejercicio todos los días. · Dúchese o báñese en agua tibia antes de irse a la cama. · Coma un refrigerio liviano o dario un vaso de leche a la hora de dormir. · Deven ejercicios de relajación en la cama para tranquilizar vang mente y vang cuerpo. · Apoye noemi piernas y vang espalda con almohadas adicionales. Si duerme de costado, pruebe a ponerse tim [employer] noemi piernas. · No use píldoras para dormir ni consuma alcohol. Podrían hacerle daño a vang bebé. Alivie los [employer] piernas · No masajee vang pantorrilla mientras tiene un calambre. · Siéntese en tim cama o silla firme. Estire la reji y [facility] ([facility] el [facility]) despacio, [place] ma[place]en, en dirección a la rodilla. Doble los dedos de los pies hacia arriba y [place]. · Póngase de pie sobre tim superficie plana y fresca. Estire los dedos de los pies hacia arriba y dé pequeños pasos con el talón. · Use tim almohadilla térmica o tim bolsa de [place] para aliviar aguila musculares. Prevenga los calambres en las piernas · Asegúrese de consumir suficiente calcio. Si está preocupada porque no está obteniendo lo suficiente, hable con vang médico. 
· Deven ejercicio todos los sunshine y estire las piernas antes de irse a dormir. · Dese un baño tibio antes de irse a dormir y pruebe a usar calentadores de piernas. Dónde puede encontrar más información en inglés? Terrell Farias a http://www.mack.com/ Edda Wiley S101 en la búsqueda para aprender más acerca de \"Semanas 18 a 22 de vang embarazo: Instrucciones de cuidado. \" Revisado: 11 febrero, 2020               Versión del contenido: 12.6 © 1432-4870 Healthwise, Incorporated. Las instrucciones de cuidado fueron adaptadas bajo licencia por Good Help Connections (which disclaims liability or warranty for this information). Si usted tiene [place] afección médica o sobre estas instrucciones, siempre pregunte a vang profesional de mirta. Healthwise, Incorporated niega toda garantía o responsabilidad por vang uso de esta información. Precauciones en el embarazo: Instrucciones de cuidado Pregnancy Precautions: Care Instructions Instrucciones de cuidado No hay tim manera karimi de prevenir el trabajo de parto antes de la fecha esperada (trabajo de parto prematuro) o de prevenir la mayoría de otros problemas en el [facility]. Norberto hay cosas que puede hacer para aumentar las probabilidades de tener un embarazo saludable. Vaya a noemi citas, siga los consejos de vang médico y cuídese. Coma brandan y opal ejercicio (si vang médico lo permite). Y asegúrese de perla abundante agua. La atención de seguimiento es tim parte clave de vang tratamiento y seguridad. Asegúrese de hacer y acudir a todas las citas, y llame a vang médico si está teniendo problemas. También es tim buena idea saber los resultados de noemi exámenes y mantener tim lista de los medicamentos que kathie. Cómo puede cuidarse en el hogar? · Asegúrese de asistir a las citas prenatales. Vang médico le tomará la presión arterial en cada consulta. Vang médico también comprobará si tiene proteínas en vang orina. Tanto la presión arterial sherin mesha la presencia de proteínas en la orina son señales de preeclampsia. Esta afección puede ser peligrosa tanto para usted mesha para vang bebé. · Natasha abundantes líquidos, suficientes para que vang orina sea de color amarillo ben o transparente mesha el agua. La deshidratación puede causar contracciones. Si tiene [employer], el corazón o el hígado y tiene que [place]'s líquidos, hable con vang médico antes de aumentar vang consumo. · Notifique a vang médico de inmediato si presenta cualquier síntoma de infección, tales mesha: ? Ardor cuando orina. ? Flujo con mal olor de la vagina. ? Comezón en la vagina. ? Brunetta Profit sin explicación. ? Dolor o sensibilidad inusual en el útero o la parte baja del abdomen. · Aliméntese en forma equilibrada. Incluya muchos alimentos que chris ricos en calcio y shweta. ? Entre los alimentos ricos en calcio se incluyen la [place], el queso, el ray, Jasmeet James y el brooke. ? Entre los alimentos ricos en shweta se incluyen las dean blakely, los [place], las aves, los [place], los frijoles, las uvas pasas, el pan de grano integral y las verduras de hojas verdes. · No fume. Si necesita ayuda para dejar de fumar, hable con vang médico sobre programas y medicamentos para dejar de fumar. Estos pueden aumentar noemi probabilidades de dejar el hábito para siempre. · No dario alcohol ni use drogas ilegales. · Siga las instrucciones de vang médico acerca de la [place]. Vang médico le dirá cuánto ejercicio puede hacer. · Pregúntele a vang médico si puede tener [employer]. Si usted está en riesgo de tener trabajo de [place], vang médico podría pedirle que no tenga relaciones sexuales. · [place] precauciones para prevenir las caídas. Ruddy el embarazo las articulaciones están más sueltas y se tiene menos equilibrio. Los deportes tales mesha el ciclismo, el esquí o el patinaje en línea pueden aumentar el riesgo de caídas. Y no monte a darlene, tip en motocicleta, opal clavados, opal esquí acuático, bucee, ni salte en paracaídas mientras está embarazada. · Evite calentarse demasiado. No use saunas ni bañeras de hidromasaje. Evite la exposición al sol en climas calientes por mucho tiempo. [place] acetaminofén (Tylenol) para bajar tim fiebre sherin. · No tome medicamentos de venta tim, productos herbarios ni suplementos sin hablar evaristo con vang médico o farmacéutico. 

Cuándo debe pedir ayuda? Llame al 911 en cualquier momento que considere que necesita atención de [place]. Por ejemplo, llame si: 
  · Se desmayó (perdió el conocimiento).  
  · Tiene convulsiones.  
  · Tiene sangrado vaginal intenso.  
  · Tiene dolor intenso en el abdomen o la pelvis.   · Le sale abundante líquido o gotea de la vagina y sabe o leidy que el cordón umbilical se está saliendo a vnag vagina. Si esto sucede, arrodíllese de inmediato, de cori forma que noemi nalgas estén más altas que vang randy. [place] disminuirá la presión sobre el cordón umbilical hasta que llegue la [facility]. Llame a vang médico ahora mismo o busque atención médica inmediata si: 
  · Tiene señales de preeclampsia, mesha: 
? Hinchazón repentina de la hyun, las destiny o los pies. ? Nuevos problemas de visión (mesha oscurecimiento, nai borroso o nai puntos). ? Dolor de randy intenso.  
  · Tiene cualquier sangrado vaginal.  
  · Tiene dolor o cólicos abdominales.  
  · Tiene fiebre.  
  · Danne Gander tenido contracciones regulares (con o sin dolor) por [place]. [place] significa que tiene 8 o más contracciones en 1 hora o que tiene 4 contracciones o más en 20 minutos después de [place] de posición y perla líquidos.  
  · Tiene tim pérdida repentina de líquido por la vagina.  
  · Tiene dolor en la parte baja de la espalda o presión en la pelvis que no desaparece.  
  · Nota que vang bebé ha dejado de moverse o se mueve mucho menos de lo normal.  
Preste especial atención a los cambios en vang mirta y asegúrese de comunicarse con vang médico si tiene algún problema. Dónde puede encontrar más información en inglés? Expreet Rameshry a http://isai-samira.info/ Renato G747 en la búsqueda para aprender más acerca de \"Precauciones en el embarazo: Instrucciones de cuidado. \" Revisado: 11 febrero, 2020               Versión del contenido: 12.6 © 8329-8543 Healthwise, Incorporated. Las instrucciones de cuidado fueron adaptadas bajo licencia por [facility] Connections (which disclaims liability or warranty for this information). Si usted tiene [place] afección médica o sobre estas instrucciones, siempre pregunte a vang profesional de mirta. [facility], Incorporated niega toda garantía o responsabilidad por vang uso de esta información.

## 2020-12-21 NOTE — PROGRESS NOTES
Chief Complaint   Patient presents with    Routine Prenatal Visit     20w3d. No LOF, no bleeding. +fetal movement. No dylon christie. Visit Vitals  BP 95/61 (BP 1 Location: Left arm, BP Patient Position: Sitting)   Pulse 88   Temp 97.9 °F (36.6 °C) (Temporal)   Ht 5' 6\" (1.676 m)   Wt 143 lb 12.8 oz (65.2 kg)   SpO2 98%   BMI 23.21 kg/m²     1. Have you been to the ER, urgent care clinic since your last visit? Hospitalized since your last visit? No    2. Have you seen or consulted any other health care providers outside of the [address] [place] [address] since your last visit? Include any pap smears or colon screening.  No

## 2021-01-20 ENCOUNTER — ROUTINE PRENATAL (OUTPATIENT)
Dept: FAMILY MEDICINE CLINIC | Age: 31
End: 2021-01-20

## 2021-01-20 DIAGNOSIS — Z3A.24 24 WEEKS GESTATION OF PREGNANCY: Primary | ICD-10-CM

## 2021-01-20 PROCEDURE — 0502F SUBSEQUENT PRENATAL CARE: CPT | Performed by: STUDENT IN AN ORGANIZED HEALTH CARE EDUCATION/TRAINING PROGRAM

## 2021-01-20 NOTE — PROGRESS NOTES
I reviewed with the resident the medical history and the resident's findings on the physical examination. I discussed with the resident the patient's diagnosis and concur with the plan. 308 [facility] @ 24w5d by 18wk scan  1.   IUP: RH pos, NIPT wnl, normal anatomy     Interested in IUD

## 2021-01-20 NOTE — PROGRESS NOTES
Kostas Nicolas  31 y.o. female  1990  [address]  [id number]    [phone] (home)      [address]:    [facility] Telephone Encounter  Jose Luis Garcia       Encounter Date: 2021 at 10 AM    Consent:  She and/or the health care decision maker is aware that this encounter will be billed as a routine OB appointment and that she may receive a bill for this telephone service, depending on her insurance coverage, and has provided verbal consent to proceed: Yes    Follow-up Prenatal     History of Present Illness   Due to language barrier, an  was used with this patient - 3 [facility]  [place]. Contractions: no  LOF: no  Vaginal bleeding: no  Fetal movement (if >20 wk): yes    COVID-19 Screenin) Patient denies complaints of shortness of breath or difficulty breathing, sore throat,  chills, fatigue, muscle aches, loss of taste or smell, or GI symptoms(nausea, vomiting or diarrhea): No  2) Patient denies complaints of cough or fever over 100F: No  3) Patient denies leaving the country in the past 14 days or any other recent travel: No  4) Patient denies being exposed to anyone with COVID-19 and has not been around any one that has been sick with COVID-19 like symptoms as listed above: No  5) Patient informed to wear mask when arriving for appointment: Yes    Vitals/Objective:   General: Patient speaking in complete sentences without effort. Normal speech and cooperative. Due to this being a Virtual Check-in/Telephone evaluation, many elements of the physical examination are unable to be assessed. Assessment and Plan:   Kostas Nicolas is a 31 y.o. G4 [id number] @ 24w5d by 18wk  evaluated by telephone. 1. IUP: A+, Ab screen neg, Hgb fract wnl, HIV NR, RPR NR, HepBsAg neg, Rubella/VZV immune, GC/CT neg, Pap NILM. NIPT low risk. Normal anatomy. S/p flu.      Interested in IUD after this baby.     -Patient informed of her next appointment: 2/15/2021 at [address] [place] [address] in clinic  -Advised to come in sooner for any concerns  -Pt informed that after 28 wk she will be asked to do weekly home BP monitoring and it was recommended she buy or borrow a cuff ahead of time. Alternative is going to a grocery store/pharmacy to check. One BP should be checked weekly and written in a log >28 weeks.  -Patient educated on kick counts (after 28 weeks): baby should move 10 times in 2 hours (can be a kick, roll, flutter, swish). -Patient was reminded about social distancing and to avoid going into public when possible. Patient understands that this encounter was a temporary measure, and the importance of further follow up and examination was emphasized. Patient verbalized understanding. I affirm this is a Patient Initiated Episode with an Established Patient who has not had a related appointment within my department in the past 7 days or scheduled within the next 24 hours. Note: not billable if this call serves to triage the patient into an appointment for the relevant concern      Electronically Signed: Jenni Wilkinson DO  Providers location when delivering service: Home      ICD-10-CM ICD-9-CM    1. 24 weeks gestation of pregnancy  Z3A.24 V22.2        Pursuant to the emergency declaration under the [facility], [facility] waiver authority and the [employer] and [employer] Act, this Virtual  Visit was conducted, with patient's consent, to reduce the patient's risk of exposure to COVID-19 and provide continuity of care for an established patient. History   Patients past medical, surgical and family histories were personally reviewed and updated.   yes    Patient Active Problem List   Diagnosis Code    Pregnancy Z34.90              Current Medications/Allergies   Medications and Allergies reviewed:    Current Outpatient Medications   Medication Sig Dispense Refill    prenatal multivit-ca-min-fe-fa tab Take  by mouth.        No Known Allergies

## 2021-02-15 ENCOUNTER — ROUTINE PRENATAL (OUTPATIENT)
Dept: FAMILY MEDICINE CLINIC | Age: 31
End: 2021-02-15

## 2021-02-15 VITALS
HEART RATE: 93 BPM | RESPIRATION RATE: 18 BRPM | TEMPERATURE: 97.6 F | HEIGHT: 62 IN | SYSTOLIC BLOOD PRESSURE: 101 MMHG | OXYGEN SATURATION: 98 % | BODY MASS INDEX: 28.37 KG/M2 | WEIGHT: 154.2 LBS | DIASTOLIC BLOOD PRESSURE: 63 MMHG

## 2021-02-15 DIAGNOSIS — Z34.92 PREGNANT AND NOT YET DELIVERED IN SECOND TRIMESTER: Primary | ICD-10-CM

## 2021-02-15 LAB
BILIRUB UR QL STRIP: NEGATIVE
ERYTHROCYTE [DISTWIDTH] IN BLOOD BY AUTOMATED COUNT: 13.7 % (ref 11.5–14.5)
GLUCOSE 1H P 100 G GLC PO SERPL-MCNC: 90 MG/DL (ref 65–140)
GLUCOSE UR-MCNC: NEGATIVE MG/DL
HCT VFR BLD AUTO: 39.1 % (ref 35–47)
HGB BLD-MCNC: 13 G/DL (ref 11.5–16)
KETONES P FAST UR STRIP-MCNC: NEGATIVE MG/DL
MCH RBC QN AUTO: 30.2 PG (ref 26–34)
MCHC RBC AUTO-ENTMCNC: 33.2 G/DL (ref 30–36.5)
MCV RBC AUTO: 90.7 FL (ref 80–99)
NRBC # BLD: 0 K/UL (ref 0–0.01)
NRBC BLD-RTO: 0 PER 100 WBC
PH UR STRIP: 6 [PH] (ref 4.6–8)
PLATELET # BLD AUTO: 196 K/UL (ref 150–400)
PMV BLD AUTO: 11.1 FL (ref 8.9–12.9)
PROT UR QL STRIP: NEGATIVE
RBC # BLD AUTO: 4.31 M/UL (ref 3.8–5.2)
SP GR UR STRIP: 1.02 (ref 1–1.03)
UA UROBILINOGEN AMB POC: NORMAL (ref 0.2–1)
URINALYSIS CLARITY POC: CLEAR
URINALYSIS COLOR POC: YELLOW
URINE BLOOD POC: NEGATIVE
URINE LEUKOCYTES POC: NORMAL
URINE NITRITES POC: NEGATIVE
WBC # BLD AUTO: 8.7 K/UL (ref 3.6–11)

## 2021-02-15 PROCEDURE — 0502F SUBSEQUENT PRENATAL CARE: CPT | Performed by: STUDENT IN AN ORGANIZED HEALTH CARE EDUCATION/TRAINING PROGRAM

## 2021-02-15 PROCEDURE — 81003 URINALYSIS AUTO W/O SCOPE: CPT | Performed by: STUDENT IN AN ORGANIZED HEALTH CARE EDUCATION/TRAINING PROGRAM

## 2021-02-15 NOTE — PATIENT INSTRUCTIONS
Dolor de espalda goyo el embarazo: Instrucciones de cuidado Backache During Pregnancy: Care Instructions Instrucciones de cuidado El dolor de espalda tiene muchas causas posibles. Con frecuencia, se debe a problemas con los músculos y los ligamentos de la espalda. El peso adicional causado por el embarazo puede tensionar la espalda. Moverse, levantar algo, ponerse de pie, sentarse o dormir de [employer] puede forzar la espalda. El dolor de espalda también puede ser tim señal de [place]. Aunque puede ser [employer], el dolor de espalda a menudo mejora por sí solo. Deven un buen tratamiento en el hogar y asegúrese de no forzar la espalda. La atención de seguimiento es tim parte clave de vang tratamiento y seguridad. Asegúrese de hacer y acudir a todas las citas, y llame a vang médico si está teniendo problemas. También es tim buena idea saber los resultados de noemi exámenes y mantener tim lista de los medicamentos que kathie. Cómo puede cuidarse en el hogar? · Consulte a vang médico si puede perla acetaminofén (Tylenol) para aliviar el dolor. No tome aspirina, ibuprofeno (Advil, Motrin) o naproxeno (Aleve). · No tome dos o más analgésicos (medicamentos para el dolor) al [employer] tiempo a menos que el médico se lo haya indicado. Muchos analgésicos contienen acetaminofén, es decir, Tylenol. El exceso de acetaminofén (Tylenol) puede ser dañino. · Acuéstese de lado con las rodillas y caderas dobladas y [place] almohada entre las piernas. [place] reduce la tensión en la espalda. · Colóquese hielo o compresas frías sobre vang espalda por entre 10 y 21 minutos cada vez, varias veces al día. Póngase un paño lomeli entre el hielo y la piel. · Los polo con agua tibia también podrían ayudar a aliviar el dolor. · Cambie de posición cada 30 minutos. Tómese descansos si tiene que estar sentada por [employer]. Levántese a caminar. · Pregúntele a vang médico cuánto ejercicio puede hacer. [place]á se sienta mejor si hace caminatas cortas o si hace movimientos suaves y estiramientos en tim piscina (alberca). · Consulte a vang médico acerca de ejercicios para estirar y fortalecer la espalda. Cuándo debe pedir ayuda? Llame a vang médico ahora mismo o busque atención médica inmediata si: 
  · Ha tenido contracciones regulares (con o sin dolor) goyo tim hora. [place] significa que tiene 8 o más contracciones en 1 hora o que tiene 4 contracciones o más en 20 minutos después de [place] de posición y perla líquidos.  
  · Tiene nuevo entumecimiento en las nalgas, en la hoa genital o rectal, o en las piernas.  
  · Leslye Ley a perder el control de los intestinos o la vejiga.  
  · Tiene dolor de espalda nuevo o que empeora. Preste especial atención a los cambios en vang mirta y asegúrese de comunicarse con vang médico si: 
  · No mejora mesha se esperaba. Dónde puede encontrar más información en inglés? Louise Arias a http://www.mack.com/ Julieta Hackett P851 en la búsqueda para aprender [place] de \"Dolor de espalda goyo el embarazo: Instrucciones de cuidado. \" Revisado: 11 febrero, 2020               Versión del contenido: 12.6 © 3602-3591 Healthwise, Incorporated. Las instrucciones de cuidado fueron adaptadas bajo licencia por Good Help Connections (which disclaims liability or warranty for this information). Si usted tiene [place] afección médica o sobre estas instrucciones, siempre pregunte a vang profesional de mirta. Healthwise, Incorporated niega toda garantía o responsabilidad por vang uso de esta información.

## 2021-02-15 NOTE — PROGRESS NOTES
Chief Complaint   Patient presents with    Routine Prenatal Visit     28 weeks 3 days      1. Have you been to the ER, urgent care clinic since your last visit? Hospitalized since your last visit? No    2. Have you seen or consulted any other health care providers outside of the [address] [place] [address] since your last visit? Include any pap smears or colon screening. No     Reviewed record in preparation for visit and have obtained necessary documentation.

## 2021-02-15 NOTE — PROGRESS NOTES
I reviewed with the resident the medical history and the resident's findings on the physical examination. I discussed with the resident the patient's diagnosis and concur with the plan. 308 [facility] @ w3d by 18wk scan  1.   IUP: RH pos, NIPT wnl, normal anatomy, GTT+third tri CBC today    Interested in IUD  Referred to [facility]
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
## 2021-02-24 ENCOUNTER — TELEPHONE (OUTPATIENT)
Dept: FAMILY MEDICINE CLINIC | Age: 31
End: 2021-02-24

## 2021-03-01 ENCOUNTER — ROUTINE PRENATAL (OUTPATIENT)
Dept: FAMILY MEDICINE CLINIC | Age: 31
End: 2021-03-01

## 2021-03-01 DIAGNOSIS — Z3A.30 30 WEEKS GESTATION OF PREGNANCY: Primary | ICD-10-CM

## 2021-03-01 PROCEDURE — 0502F SUBSEQUENT PRENATAL CARE: CPT | Performed by: STUDENT IN AN ORGANIZED HEALTH CARE EDUCATION/TRAINING PROGRAM

## 2021-03-01 NOTE — PROGRESS NOTES
Marixa So  31 y.o. female  1990  [address]  [id number]    [phone] (home)      [address]:    [facility] Telephone Encounter  Rd Kuhn MD       Encounter Date: 3/1/2021 at 10:46 AM    Consent:  She is aware that this encounter will be billed as a routine OB appointment and that she may receive a bill for this telephone service, depending on her insurance coverage, and has provided verbal consent to proceed: Yes    Follow-up Prenatal   Interpretor #[id number] used for this encounter. History of Present Illness   Contractions: no  LOF: no  Vaginal bleeding: no  Fetal movement: yes a lot, last time was 20 min ago   Taking PNV: daily     Patient states that for her aches and pains is it possible to take Tylenol or use a heating patch. We discuss this. Blood Pressure Log  Was not informed of checking her BP. Discussed it today. Vitals/Objective:   General: Patient speaking in complete sentences without effort. Normal speech and cooperative. Due to this being a Virtual Check-in/Telephone evaluation, many elements of the physical examination are unable to be assessed. Assessment and Plan:   Marixa So is a 31 y.o. G4 [id number] @ 30w3d evaluated by telephone. IUP  - Initial OB labs: A+, Ab screen neg, Hgb frc wnl, CBC w/Hb 14.5, HbsAg neg, VZV/Rubella immune, RPR/HIV NR, GC/CT neg, Ucx no growth, Pap NILM HPV neg. S/p Flu. Needs Tdap vaccine at next visit. - Passed 1hr GTT    -Patient informed of her next in-office appointment: 3/15/2021    -Advised to come in sooner for any concerns    -Pt informed to do weekly home BP monitoring and it was recommended she buy or borrow a cuff ahead of time. Alternative is going to a grocery store/pharmacy to check.   One BP should be checked weekly and written in a log >28 weeks.    -Patient educated on kick counts (after 28 weeks): baby should move 10 times in 2 hours (can be a kick, roll, flutter, melia). -Patient was reminded about social distancing and to avoid going into public when possible. Patient understands that this encounter was a temporary measure, and the importance of further follow up and examination was emphasized. Patient verbalized understanding. I affirm this is a Patient Initiated Episode with an Established Patient who has not had a related appointment within my department in the past 7 days or scheduled within the next 24 hours. Note: not billable if this call serves to triage the patient into an appointment for the relevant concern      Electronically Signed: Dylan Laguerre MD  Providers location when delivering service: Home      ICD-10-CM ICD-9-CM    1. 30 weeks gestation of pregnancy  Z3A.30 V22.2        Pursuant to the emergency declaration under the [address] [place] [address], [facility] waiver authority and the [employer] Act, this Virtual  Visit was conducted, with patient's consent, to reduce the patient's risk of exposure to COVID-19 and provide continuity of care for an established patient. History   Patients past medical, surgical and family histories were personally reviewed and updated. Patient Active Problem List   Diagnosis Code    Pregnancy Z34.90              Current Medications/Allergies   Medications and Allergies reviewed:    Current Outpatient Medications   Medication Sig Dispense Refill    prenatal multivit-ca-min-fe-fa tab Take  by mouth.        No Known Allergies

## 2021-03-15 ENCOUNTER — ROUTINE PRENATAL (OUTPATIENT)
Dept: FAMILY MEDICINE CLINIC | Age: 31
End: 2021-03-15

## 2021-03-15 VITALS
HEART RATE: 92 BPM | TEMPERATURE: 97.5 F | WEIGHT: 158 LBS | BODY MASS INDEX: 29.08 KG/M2 | HEIGHT: 62 IN | RESPIRATION RATE: 18 BRPM | OXYGEN SATURATION: 98 % | SYSTOLIC BLOOD PRESSURE: 101 MMHG | DIASTOLIC BLOOD PRESSURE: 64 MMHG

## 2021-03-15 DIAGNOSIS — Z3A.32 32 WEEKS GESTATION OF PREGNANCY: Primary | ICD-10-CM

## 2021-03-15 PROCEDURE — 90715 TDAP VACCINE 7 YRS/> IM: CPT | Performed by: STUDENT IN AN ORGANIZED HEALTH CARE EDUCATION/TRAINING PROGRAM

## 2021-03-15 PROCEDURE — 90471 IMMUNIZATION ADMIN: CPT | Performed by: STUDENT IN AN ORGANIZED HEALTH CARE EDUCATION/TRAINING PROGRAM

## 2021-03-15 PROCEDURE — 0502F SUBSEQUENT PRENATAL CARE: CPT | Performed by: STUDENT IN AN ORGANIZED HEALTH CARE EDUCATION/TRAINING PROGRAM

## 2021-03-15 NOTE — PROGRESS NOTES
I reviewed with the resident the medical history and the resident's findings on the physical examination. I discussed with the resident the patient's diagnosis and concur with the plan. 29yo  @ 32w3d by 18wk scan  1.   IUP: RH pos, NIPT wnl, normal anatomy, GTT+CBC wnl     Desires OCPs  Referred to [facility]

## 2021-03-15 NOTE — PROGRESS NOTES
Chief Complaint   Patient presents with    Routine Prenatal Visit     Feliberto Samuel [address], 32 weeks 3 days     1. Have you been to the ER, urgent care clinic since your last visit? Hospitalized since your last visit? No    2. Have you seen or consulted any other health care providers outside of the [address] [place] [address] since your last visit? Include any pap smears or colon screening. No     Reviewed record in preparation for visit and have obtained necessary documentation. Patient denies have any bleeding,cramping ,spotting or leaking of fluid.    Discharge x 1 month cream

## 2021-03-15 NOTE — PROGRESS NOTES
Return OB Visit       Subjective:   Cory Medina 27 y.o.   RAFY: 2021, by Ultrasound  GA:  32w3d. LOF: No  Vaginal bleeding: No  Fetal movement: Yes   Contractions: No      Allergies- reviewed:   No Known Allergies  Medications- reviewed:   Current Outpatient Medications   Medication Sig    prenatal multivit-ca-min-fe-fa tab Take  by mouth. No current facility-administered medications for this visit. Past Medical History- reviewed:  No past medical history on file.   Past Surgical History- reviewed:   Past Surgical History:   Procedure Laterality Date    HX CHOLECYSTECTOMY      [place] BREAST SURGERY PROCEDURE UNLISTED       Social History- reviewed:  Social History     Socioeconomic History    Marital status: SINGLE     Spouse name: Not on file    Number of children: Not on file    Years of education: Not on file    Highest education level: Not on file   Occupational History    Not on file   Social Needs    Financial resource strain: Not on file    Food insecurity     Worry: Not on file     Inability: Not on file    Transportation needs     Medical: Not on file     Non-medical: Not on file   Tobacco Use    Smoking status: Never Smoker    Smokeless tobacco: Never Used   Substance and Sexual Activity    Alcohol use: Not Currently     Frequency: Never     Comment: social    Drug use: Never    Sexual activity: Yes     Partners: Male     Birth control/protection: None   Lifestyle    Physical activity     Days per week: Not on file     Minutes per session: Not on file    Stress: Not on file   Relationships    Social connections     Talks on phone: Not on file     Gets together: Not on file     Attends Episcopalian service: Not on file     Active member of club or organization: Not on file     Attends meetings of clubs or organizations: Not on file     Relationship status: Not on file    Intimate partner violence     Fear of current or ex partner: Not on file     Emotionally abused: Not on file     Physically abused: Not on file     Forced sexual activity: Not on file   Other Topics Concern    Not on file   Social History Narrative    Not on file     Immunizations- reviewed:   Immunization History   Administered Date(s) Administered    Influenza Vaccine [employer]) PF (>6 Mo Flulaval, Fluarix, and >3 Yrs Afluria, Fluzone 59509) 2020       Objective:     Visit Vitals  /64 (BP 1 Location: Right upper arm, BP Patient Position: Sitting)   Pulse 92   Temp 97.5 °F (36.4 °C) (Temporal)   Resp 18   Ht 5' 1.89\" (1.572 m)   Wt 158 lb (71.7 kg)   LMP 2020 (Exact Date)   SpO2 98%   BMI 29.00 kg/m²       Physical Exam:  GENERAL APPEARANCE: alert, well appearing, in no apparent distress  ABDOMEN: gravid, fundal height 33 cm, FHT present at 130 bpm  PSYCH: normal mood and affect    Labs  No results found for this or any previous visit (from the past 12 hour(s)). Assessment         ICD-10-CM ICD-9-CM    1. 32 weeks gestation of pregnancy  Z3A.32 V22.2 TETANUS, DIPHTHERIA TOXOIDS AND ACELLULAR PERTUSSIS VACCINE (TDAP), IN INDIVIDS. >=7, IM      ID IMMUNIZ ADMIN,1 SINGLE/COMB VAC/TOXOID         Plan   27 y.o.  32w3d RAFY 2021, by Ultrasound here for return OB visit     SIUP at 32w3d:  -PNL: A+, Ab screen neg, Hgb frc wnl, CBC w/Hb 14.5, HbsAg neg, VZV/Rubella immune, RPR/HIV NR, GC/CT neg, Ucx no growth, Pap NILM HPV neg.  - S/p Flu vaccine  - 1hr GTT nml  -NIPT low risk; female  -Anatomy scan nml  -Third tri Hgb 12.5  -Tdap today     Discussed options; no longer interested in [place]; will continue with OCPs postpartum.     Orders Placed This Encounter    ID IMMUNIZ ADMIN,1 SINGLE/COMB VAC/TOXOID    TETANUS, DIPHTHERIA TOXOIDS AND ACELLULAR PERTUSSIS VACCINE (TDAP), IN INDIVIDS. >=7, IM     Labor precautions discussed, including: Regular painful contractions, lasting for greater than one hour, taking your breath away; any vaginal bleeding; any leakage of fluid; or absent or decreased fetal movement. Call M.D. on call if any of these symptoms or signs occur. I have discussed the diagnosis with the patient and the intended plan as seen in the above orders. The patient has received an after-visit summary and questions were answered concerning future plans. I have discussed medication side effects and warnings with the patient as well. Informed pt to return to the office or go to the ER if she experiences vaginal bleeding, vaginal discharge, leaking of fluid, pelvic cramping.     Pt seen and discussed with Dr. Dorina Apple (attending physician)    Lizy Rudolph MD  Family Medicine Resident

## 2021-03-29 ENCOUNTER — ROUTINE PRENATAL (OUTPATIENT)
Dept: FAMILY MEDICINE CLINIC | Age: 31
End: 2021-03-29

## 2021-03-29 DIAGNOSIS — Z3A.34 34 WEEKS GESTATION OF PREGNANCY: Primary | ICD-10-CM

## 2021-03-29 PROCEDURE — 0502F SUBSEQUENT PRENATAL CARE: CPT | Performed by: STUDENT IN AN ORGANIZED HEALTH CARE EDUCATION/TRAINING PROGRAM

## 2021-03-29 NOTE — PROGRESS NOTES
Jessica Mcneil  27 y.o. female  1990  [address]  [id number]    [phone] (home)      [address]:    [facility] Telephone Encounter  Jovan Caro MD       Encounter Date: 3/29/2021 at 11:28 AM    Consent:  She and/or the health care decision maker is aware that this encounter will be billed as a routine OB appointment and that she may receive a bill for this telephone service, depending on her insurance coverage, and has provided verbal consent to proceed: Yes    Follow-up Prenatal   Interpretor # [id number]  History of Present Illness   Contractions: no strong regular contractions. She started to feel some contractions a week ago, not every day and not very often. She feels pain in her lower belly and occurs twice a day, lasting 1-2 min. Self-resolves. LOF: no  Vaginal bleeding: no  Fetal movement: yes, very active all day and at night. Baby doesn't seem to sleep. Blood Pressure Log  Patient has not been checking it at home. She states it is also normal in clinic and she feels fine. Vitals/Objective:   General: Patient speaking in complete sentences without effort. Normal speech and cooperative. Due to this being a Virtual Check-in/Telephone evaluation, many elements of the physical examination are unable to be assessed. Assessment and Plan:   Jessica Mcneil is a 27 y.o. G4 [id number] @ 34w3d evaluated by telephone. PNL: A+, Ab screen neg, Hgb frc wnl, CBC w/Hb 14.5, HbsAg neg, VZV/Rubella immune, RPR/HIV NR, GC/CT neg, Ucx no growth, Pap NILM HPV neg. S/p Flu and Tdap. Passed 1hr GTT. Third trimester CBC w/Hb 13     - Patient informed of her next in-office appointment: 4/12/2021     -Advised to come in sooner for any concerns    -Pt informed that after 28 wk she will be asked to do weekly home BP monitoring and it was recommended she buy or borrow a cuff ahead of time. Alternative is going to a grocery store/pharmacy to check.   One BP should be checked weekly and written in a log >28 weeks.    -Patient educated on kick counts (after 28 weeks): baby should move 10 times in 2 hours (can be a kick, roll, flutter, swish). -Patient was reminded about social distancing and to avoid going into public when possible. Patient understands that this encounter was a temporary measure, and the importance of further follow up and examination was emphasized. Patient verbalized understanding. I affirm this is a Patient Initiated Episode with an Established Patient who has not had a related appointment within my department in the past 7 days or scheduled within the next 24 hours. Note: not billable if this call serves to triage the patient into an appointment for the relevant concern      Electronically Signed: Tone Birch MD  Providers location when delivering service: Home      ICD-10-CM ICD-9-CM    1. 34 weeks gestation of pregnancy  Z3A.34 V22.2        Pursuant to the emergency declaration under the [facility] waiver authority and the [facility] and [facility] Act, this Virtual  Visit was conducted, with patient's consent, to reduce the patient's risk of exposure to COVID-19 and provide continuity of care for an established patient. History   Patients past medical, surgical and family histories were personally reviewed and updated. Patient Active Problem List   Diagnosis Code    Pregnancy Z34.90              Current Medications/Allergies   Medications and Allergies reviewed:    Current Outpatient Medications   Medication Sig Dispense Refill    prenatal multivit-ca-min-fe-fa tab Take  by mouth.        No Known Allergies

## 2021-04-11 NOTE — PROGRESS NOTES
Return OB Visit       Subjective:   Humphrey Andre 27 y.o.   RAFY: 2021, by Ultrasound  GA:  36w2d. LOF: No  Vaginal bleeding: No  Fetal movement: Yes  Contractions: [place]      Allergies- reviewed:   No Known Allergies  Medications- reviewed:   Current Outpatient Medications   Medication Sig    prenatal multivit-ca-min-fe-fa tab Take  by mouth. No current facility-administered medications for this visit. Past Medical History- reviewed:  No past medical history on file.   Past Surgical History- reviewed:   Past Surgical History:   Procedure Laterality Date    HX CHOLECYSTECTOMY      [place] BREAST SURGERY PROCEDURE UNLISTED       Social History- reviewed:  Social History     Socioeconomic History    Marital status: SINGLE     Spouse name: Not on file    Number of children: Not on file    Years of education: Not on file    Highest education level: Not on file   Occupational History    Not on file   Social Needs    Financial resource strain: Not on file    Food insecurity     Worry: Not on file     Inability: Not on file    Transportation needs     Medical: Not on file     Non-medical: Not on file   Tobacco Use    Smoking status: Never Smoker    Smokeless tobacco: Never Used   Substance and Sexual Activity    Alcohol use: Not Currently     Frequency: Never     Comment: social    Drug use: Never    Sexual activity: Yes     Partners: Male     Birth control/protection: None   Lifestyle    Physical activity     Days per week: Not on file     Minutes per session: Not on file    Stress: Not on file   Relationships    Social connections     Talks on phone: Not on file     Gets together: Not on file     Attends Amish service: Not on file     Active member of club or organization: Not on file     Attends meetings of clubs or organizations: Not on file     Relationship status: Not on file    Intimate partner violence     Fear of current or ex partner: Not on file     Emotionally abused: Not on file     Physically abused: Not on file     Forced sexual activity: Not on file   Other Topics Concern    Not on file   Social History Narrative    Not on file     Immunizations- reviewed:   Immunization History   Administered Date(s) Administered    Influenza Vaccine [employer]) PF (>6 Mo Flulaval, Fluarix, and >3 Yrs Afluria, Fluzone 37184) 2020    Tdap 03/15/2021       Objective:     Visit Vitals  BP 93/60 (BP 1 Location: Right upper arm, BP Patient Position: Sitting)   Pulse 94   Temp 98 °F (36.7 °C) (Temporal)   Resp 16   Ht 5' 1.81\" (1.57 m)   Wt 166 lb (75.3 kg)   LMP 2020 (Exact Date)   SpO2 98%   BMI 30.55 kg/m²       Physical Exam:  GENERAL APPEARANCE: alert, well appearing, in no apparent distress  ABDOMEN: gravid, fundal height 37 cm, FHT present at 140 bpm  PSYCH: normal mood and affect    Labs  No results found for this or any previous visit (from the past 12 hour(s)). Assessment         ICD-10-CM ICD-9-CM    1. 36 weeks gestation of pregnancy  Z3A.36 V22.2 CULTURE, GENITAL GROUP B STREP      CULTURE, GENITAL GROUP B STREP         Plan   27 y.o.  36w2d RAFY 2021, by Ultrasound here for return OB visit     SIUP at 36w2d:  -PNL:  A+, Ab screen neg, Hgb frc wnl, CBC w/Hb 14.5, HbsAg neg, VZV/Rubella immune, RPR/HIV NR, GC/CT neg, Ucx no growth, Pap NILM HPV neg.  - S/p Flu and Tdap vaccine  - 1hr GTT nml  -NIPT low risk; female  -Anatomy scan nml  -Third tri Hgb 12.5  -GBS today   -Bedside US confirms cephalic presentation        Discussed options; no longer interested in [place]; will continue with OCPs postpartum.       Orders Placed This Encounter    CULTURE, GENITAL GROUP B STREP     Standing Status:   Future     Number of Occurrences:   1     Standing Expiration Date:   2022     Labor precautions discussed, including: Regular painful contractions, lasting for greater than one hour, taking your breath away; any vaginal bleeding; any leakage of fluid; or absent or decreased fetal movement. Call M.D. on call if any of these symptoms or signs occur. I have discussed the diagnosis with the patient and the intended plan as seen in the above orders. The patient has received an after-visit summary and questions were answered concerning future plans. I have discussed medication side effects and warnings with the patient as well. Informed pt to return to the office or go to the ER if she experiences vaginal bleeding, vaginal discharge, leaking of fluid, pelvic cramping.     Pt seen and discussed with Dr. Merrick Del Real (attending physician)    Frida Kidd MD  Family Medicine Resident

## 2021-04-12 ENCOUNTER — ROUTINE PRENATAL (OUTPATIENT)
Dept: FAMILY MEDICINE CLINIC | Age: 31
End: 2021-04-12

## 2021-04-12 VITALS
HEART RATE: 94 BPM | OXYGEN SATURATION: 98 % | HEIGHT: 62 IN | BODY MASS INDEX: 30.55 KG/M2 | TEMPERATURE: 98 F | DIASTOLIC BLOOD PRESSURE: 60 MMHG | WEIGHT: 166 LBS | SYSTOLIC BLOOD PRESSURE: 93 MMHG | RESPIRATION RATE: 16 BRPM

## 2021-04-12 DIAGNOSIS — Z3A.36 36 WEEKS GESTATION OF PREGNANCY: Primary | ICD-10-CM

## 2021-04-12 PROCEDURE — 0502F SUBSEQUENT PRENATAL CARE: CPT | Performed by: STUDENT IN AN ORGANIZED HEALTH CARE EDUCATION/TRAINING PROGRAM

## 2021-04-12 NOTE — PROGRESS NOTES
Chief Complaint   Patient presents with    Routine Prenatal Visit     36 weeks 3 days      1. Have you been to the ER, urgent care clinic since your last visit? Hospitalized since your last visit? No    2. Have you seen or consulted any other health care providers outside of the [address] [place] [address] since your last visit? Include any pap smears or colon screening. No    Reviewed record in preparation for visit and have obtained necessary documentation. Patient denies have any bleeding,cramping ,spotting or leaking of fluid.

## 2021-04-12 NOTE — PROGRESS NOTES
I reviewed with the resident the medical history and the resident's findings on the physical examination. I discussed with the resident the patient's diagnosis and concur with the plan. [facility] @ 36w3d by 18wk scan  1.   IUP: RH pos, NIPT wnl, normal anatomy, GTT+CBC wnl, GBS today, cephalic by mayg Meehan OCPs  Referred to [facility]   Seen by [facility]

## 2021-04-14 LAB
BACTERIA SPEC CULT: ABNORMAL
BACTERIA SPEC CULT: ABNORMAL
SERVICE CMNT-IMP: ABNORMAL

## 2021-04-16 NOTE — PROGRESS NOTES
Return OB Visit       Subjective:   Margie Salazar 27 y.o.   RAFY: 2021, by Ultrasound  GA:  37w3d. [id number]#[id number]    LOF: no  Vaginal bleeding: none  Fetal movement: yes, frequently  Contractions: abdominal tightening in the lower belly but not persistent pain      Allergies- reviewed:   No Known Allergies  Medications- reviewed:   Current Outpatient Medications   Medication Sig    prenatal multivit-ca-min-fe-fa tab Take  by mouth. No current facility-administered medications for this visit. Past Medical History- reviewed:  No past medical history on file.   Past Surgical History- reviewed:   Past Surgical History:   Procedure Laterality Date    HX CHOLECYSTECTOMY      [place] BREAST SURGERY PROCEDURE UNLISTED       Social History- reviewed:  Social History     Socioeconomic History    Marital status: SINGLE     Spouse name: Not on file    Number of children: Not on file    Years of education: Not on file    Highest education level: Not on file   Occupational History    Not on file   Social Needs    Financial resource strain: Not on file    Food insecurity     Worry: Not on file     Inability: Not on file   [employer] needs     Medical: Not on file     Non-medical: Not on file   Tobacco Use    Smoking status: Never Smoker    Smokeless tobacco: Never Used   Substance and Sexual Activity    Alcohol use: Not Currently     Frequency: Never     Comment: social    Drug use: Never    Sexual activity: Yes     Partners: Male     Birth control/protection: None   Lifestyle    Physical activity     Days per week: Not on file     Minutes per session: Not on file    Stress: Not on file   Relationships    Social connections     Talks on phone: Not on file     Gets together: Not on file     Attends Protestant service: Not on file     Active member of club or organization: Not on file     Attends meetings of clubs or organizations: Not on file     Relationship status: Not on file    Intimate partner violence     Fear of current or ex partner: Not on file     Emotionally abused: Not on file     Physically abused: Not on file     Forced sexual activity: Not on file   Other Topics Concern    Not on file   Social History Narrative    Not on file     Immunizations- reviewed:   Immunization History   Administered Date(s) Administered    Influenza Vaccine [employer]) PF (>6 Mo Flulaval, Fluarix, and >3 Yrs Afluria, Fluzone 18506) 2020    Tdap 03/15/2021       Objective:     Visit Vitals  /66   Pulse 87   Temp 98 °F (36.7 °C) (Temporal)   Resp 16   Ht 5' 2\" (1.575 m)   Wt 168 lb (76.2 kg)   LMP 2020 (Exact Date)   SpO2 96%   BMI 30.73 kg/m²       Physical Exam:  GENERAL APPEARANCE: alert, well appearing, in no apparent distress  ABDOMEN: gravid, fundal height 37cm, FHT present at 145 bpm  PSYCH: normal mood and affect  CVE: 1/30/-3, unconfirmed by attending            Labs  No results found for this or any previous visit (from the past 12 hour(s)). Assessment         ICD-10-CM ICD-9-CM    1. 37 weeks gestation of pregnancy  Z3A.37 V22.2          Plan   27 y.o.  37w3d RAFY 2021, by Ultrasound here for return OB visit     SIUP at 37wk3d:     -PNL:  A+, Ab screen neg, Hgb frc wnl, CBC w/Hb 14.5, HbsAg neg, VZV/Rubella immune, RPR/HIV NR, GC/CT neg, Ucx no growth, Pap NILM HPV neg.  - S/p Flu and Tdap vaccine  - 1hr GTT nml  -NIPT low risk; female  -Anatomy scan nml  -Third tri Hgb 12.5  -GBS positive, will need abx in labor  - CVE today 1/30/-3, unconfirmed by attending  -Bedside US confirms cephalic presentation (see above)  - f/u x 1 week with Dr Angel Luis Lopez  3:15pm          No orders of the defined types were placed in this encounter. Labor precautions discussed, including: Regular painful contractions, lasting for greater than one hour, taking your breath away; any vaginal bleeding; any leakage of fluid; or absent or decreased fetal movement.  Call M.D. on call if any of these symptoms or signs occur. I have discussed the diagnosis with the patient and the intended plan as seen in the above orders. The patient has received an after-visit summary and questions were answered concerning future plans. I have discussed medication side effects and warnings with the patient as well. Informed pt to return to the office or go to the ER if she experiences vaginal bleeding, vaginal discharge, leaking of fluid, pelvic cramping.     Patient discussed with Dr. Greg Nieves (attending physician)    Wild English MD  Family Medicine Resident

## 2021-04-19 ENCOUNTER — ROUTINE PRENATAL (OUTPATIENT)
Dept: FAMILY MEDICINE CLINIC | Age: 31
End: 2021-04-19

## 2021-04-19 VITALS
HEART RATE: 87 BPM | OXYGEN SATURATION: 96 % | SYSTOLIC BLOOD PRESSURE: 103 MMHG | TEMPERATURE: 98 F | DIASTOLIC BLOOD PRESSURE: 66 MMHG | HEIGHT: 62 IN | BODY MASS INDEX: 30.91 KG/M2 | WEIGHT: 168 LBS | RESPIRATION RATE: 16 BRPM

## 2021-04-19 DIAGNOSIS — O99.820 GBS (GROUP B STREPTOCOCCUS CARRIER), +RV CULTURE, CURRENTLY PREGNANT: ICD-10-CM

## 2021-04-19 DIAGNOSIS — Z3A.37 37 WEEKS GESTATION OF PREGNANCY: Primary | ICD-10-CM

## 2021-04-19 PROCEDURE — 0502F SUBSEQUENT PRENATAL CARE: CPT | Performed by: STUDENT IN AN ORGANIZED HEALTH CARE EDUCATION/TRAINING PROGRAM

## 2021-04-19 NOTE — PROGRESS NOTES
Chief Complaint   Patient presents with    Routine Prenatal Visit     Sol Dust 3d today. No bleeding or LOF.  +FM. 1. Have you been to the ER, urgent care clinic since your last visit? Hospitalized since your last visit? No    2. Have you seen or consulted any other health care providers outside of the [address] [place] [address] since your last visit? Include any pap smears or colon screening.  No

## 2021-04-19 NOTE — PROGRESS NOTES
I reviewed with the resident the medical history and the resident's findings on the physical examination. I discussed with the resident the patient's diagnosis and concur with the plan. [address] [facility] @ 37w3d by 18wk scan  1.   IUP: RH pos, NIPT wnl, normal anatomy, GTT+CBC wnl, GBS pos, cephalic by magy Meehan OCPs  Referred to [facility]   Seen by [facility]

## 2021-04-19 NOTE — PATIENT INSTRUCTIONS
Drew Leeta a torres médico goyo el embarazo (después de 20 semanas)  Learning About When to Call Your Doctor During Pregnancy (After 20 Weeks)  Instrucciones de cuidado  Es normal que tenga inquietudes acerca de lo que podría ser un problema goyo el Zuri Delay. Aunque la mayoría de las mujeres embarazadas no tienen ningún problema grave, es importante saber cuándo llamar a torres médico si tiene determinados síntomas o señales de trabajo de [place]. Estas son algunas sugerencias generales. Torres médico puede darle más información sobre cuándo llamar. Cuándo llamar a torres médico (después de 20 semanas)  Llame al 911 en cualquier momento que considere que necesita atención de [place]. Por ejemplo, llame si:  · Tiene sangrado vaginal intenso. · Tiene dolor repentino e intenso en el abdomen. · Se desmayó (perdió el conocimiento). · Tiene tim convulsión. · Ve o siente el cordón umbilical.  · Hermila que está a punto de vickie a cyrus a torres bebé y no puede llegar en forma karimi al hospital.  Brenda Hair a torres médico ahora mismo o busque atención médica inmediata si:  · Tiene sangrado vaginal.  · Tiene dolor en el abdomen. · Tiene fiebre. · Tiene síntomas de preeclampsia, mesha:  ? Hinchazón repentina de la hyun, las destiny o los pies. ? Nuevos problemas de visión (mesha oscurecimiento, nai borroso o nai puntos). ? Dolor de randy intenso. · Tiene tim pérdida repentina de líquido por la vagina. (Piensa que rompió la thelma). · Piensa que puede kiara comenzado el [place] de [place]. [place]app significa que ha tenido al menos 6 contracciones en [employer]. · Nota que torres bebé ha dejado de moverse o lo hace mucho menos de lo habitual.  · Tiene síntomas de tim infección urinaria. Estos pueden incluir:  ? Dolor o ardor al orinar. ? Necesidad de orinar con frecuencia sin poder eliminar mucha orina. ? Dolor en el flanco, que se encuentra manjit debajo de la caja torácica y [place] de la cintura en un lado de la espalda.   ? Juan David en la Mel Hire especial atención a los cambios en vang mirta y asegúrese de comunicarse con vang médico si:  · Tiene flujo vaginal con un olor desagradable. · Tiene cambios en la piel, tales mesha:  ? Salpullido. ? Comezón. ? Color amarillento en la piel. · Tiene otras inquietudes acerca de vang embarazo. Si tiene signos de trabajo de parto al llegar a las 37 [address] o más  Si tiene señales de [place] de parto a las 37 semanas o [place], es posible que vang médico le diga que llame cuando vang trabajo de parto se vuelva más [place]. Los síntomas del trabajo de parto activo incluyen:  · Contracciones que son regulares. · Contracciones a intervalos de menos de 5 minutos. · Contracciones goyo las cuales es difícil hablar. La atención de seguimiento es tim parte clave de vang tratamiento y seguridad. Asegúrese de hacer y acudir a todas las citas, y llame a vang médico si está teniendo problemas. También es tim buena idea saber los resultados de noemi exámenes y mantener tim lista de los medicamentos que kathie. ¿Dónde puede encontrar más información en inglés? Vaya a [email or web address].gray.com/  Renato [id number] en la búsqueda para aprender más acerca de \"Aprenda cuándo llamar a vang médico goyo el embarazo (después de 20 semanas). \"  Revisado: 8 octubre, 2020               Versión del contenido: 12.8  © 2359-3539 Healthwise, Incorporated. Las instrucciones de cuidado fueron adaptadas bajo licencia por [facility]ctions (which disclaims liability or warranty for this information). Si usted tiene Franklin Martin afección médica o sobre estas instrucciones, siempre pregunte a vang profesional de mirta. [facility] niega toda garantía o responsabilidad por vang uso de esta información. Semana 40 de vang embarazo: Instrucciones de cuidado  Week 37 of Your Pregnancy: Care Instructions  Instrucciones de cuidado    Usted está cerca del final de vang embarazo, y probablemente esté bastante incómoda. Puede ser más difícil caminar. Acostarse probablemente tampoco sea cómodo. Podría tener dificultad para dormir o para permanecer dormida. La mayoría de las mujeres lon a cyrus entre las 40 y 43 semanas. Bernie es un buen momento para pensar en preparar un maletín para el hospital con los artículos que necesitará. Entonces estará lista para cuando comience el [place] de [place]. La atención de seguimiento es tim parte clave de vang tratamiento y seguridad. Asegúrese de hacer y acudir a todas las citas, y llame a vang médico si está teniendo problemas. También es tim buena idea saber los resultados de noemi exámenes y mantener tim lista de los medicamentos que kathie. ¿Cómo puede cuidarse en el hogar? Aprenda sobre el amamantamiento  · El amamantamiento es lo mejor para vang bebé y [employer] es aguirre para usted. · La leche materna tiene anticuerpos que ayudan al bebé a combatir las infecciones. · Las madres que amamantan suelen bajar de peso más rápidamente, debido a que elaborar leche quema calorías. · Informarse acerca de las mejores maneras de sostener a vang bebé le facilitará el amamantamiento. · Deje que vang nicole bañe y [facility] pañales del bebé para que no se sienta excluida. Acurrúquense juntos cuando amamante a vang bebé. · Es posible que desee aprender a usar un sacaleches y guardar vang Marcela Richard. · Si elige alimentar a vang bebé con biberón, hágalo de la [employer] resulte más parecida al amamantamiento para que pueda establecer un vínculo con vang bebé. Siempre sostenga al bebé y el biberón. No apoye el biberón ni deje que vang bebé se quede dormido con él. Aprenda sobre el llanto  · Es normal que los bebés lloren de 1 a 3 horas al día. Algunos lloran más, otros menos. · Los bebés no lloran para causarle molestias ni porque usted sea [address]. · Llorar es la forma de comunicarse que tiene el bebé. Vang bebé puede [employer] o gases, necesitar un cambio de pañal, sentir frío o calor, sentirse solo o tenso.  A veces, los bebés lloran por motivos desconocidos. · Si usted responde a las necesidades de vang bebé, manisha aprenderá a confiar en usted. · Intente mantener la calma cuando vang bebé llore. Vang bebé se puede sentir más molesto si siente que usted [place]. Sepa cómo cuidar a vang recién nacido  · El muñón del cordón umbilical de vang bebé se caerá solo, por lo general entre las semanas 1 y 2. Para cuidar la hoa del cordón umbilical de vang bebé:  ? Limpie la hoa de la parte inferior del cordón umbilical 2 o 3 veces al día. ? Ponga especial atención en la hoa en donde el cordón se fija a la piel. ? Mantenga el pañal doblado debajo del cordón. ? Utilice tim toallita húmeda o algodón para darle un baño de esponja a vang bebé hasta que se le haya caído el muñón. · La primera evacuación intestinal oscura de vang bebé se conoce mesha meconio. Después del meconio, el bebé tendrá noemi propios hábitos de evacuación intestinal.  ? Algunos bebés, especialmente aquellos que se alimentan con [employer], tienen varias evacuaciones al día. Otros tienen [employer] al día, o tim cada 2 o 3 días. ? Los bebés que son amamantados a menudo tienen evacuaciones sueltas amarillentas. Los bebés que se alimentan con leche de fórmula evacuan heces más sólidas. ? Si vang bebé tiene evacuaciones mesha bolitas pequeñas, está estreñido. Después de 2 sunshine de estreñimiento, llame al médico de vang bebé. · Si vang bebé va a ser Evelene Se, usted puede cuidarlo en el hogar. ? Enjuáguele delicadamente el pene con agua tibia cada vez que le cambie los pañales. No intente retirar la membrana que se forma sobre el pene. Esta membrana desaparecerá por sí beverley. Séquele la hoa con toques suaves de toalla. ? Coloque tim pomada a base de petróleo, mesha vaselina, en la hoa del pañal que tendrá contacto con el pene de vang bebé. [place] evitará que el pañal se le pegue al bebé. ? Pregúntele al médico acerca de darle acetaminofén (Tylenol) a vang bebé para el dolor.   ¿Dónde puede encontrar más información en inglés? Vaya a [email or web address]  Roseline Browne [id number] en la búsqueda para aprender más acerca de \"Semana 40 de vang embarazo: Instrucciones de cuidado. \"  Revisado: 8 octubre, 2020               Versión del contenido: 12.8  © 1699-7363 Healthwise, Incorporated. Las instrucciones de cuidado fueron adaptadas bajo licencia por Good [facility] Connections (which disclaims liability or warranty for this information). Si usted tiene [place]send afección médica o sobre estas instrucciones, siempre pregunte a vang profesional de mirta. [place], [facility] niega toda garantía o responsabilidad por vang uso de esta información.

## 2021-04-26 ENCOUNTER — ROUTINE PRENATAL (OUTPATIENT)
Dept: FAMILY MEDICINE CLINIC | Age: 31
End: 2021-04-26

## 2021-04-26 VITALS
BODY MASS INDEX: 31.21 KG/M2 | SYSTOLIC BLOOD PRESSURE: 107 MMHG | WEIGHT: 169.6 LBS | HEART RATE: 89 BPM | OXYGEN SATURATION: 98 % | DIASTOLIC BLOOD PRESSURE: 67 MMHG | RESPIRATION RATE: 16 BRPM | TEMPERATURE: 97.5 F | HEIGHT: 62 IN

## 2021-04-26 DIAGNOSIS — Z3A.38 38 WEEKS GESTATION OF PREGNANCY: Primary | ICD-10-CM

## 2021-04-26 PROCEDURE — 0502F SUBSEQUENT PRENATAL CARE: CPT | Performed by: STUDENT IN AN ORGANIZED HEALTH CARE EDUCATION/TRAINING PROGRAM

## 2021-04-26 NOTE — PATIENT INSTRUCTIONS
Semana 38 de vang embarazo: Instrucciones de cuidado  Week 38 of Your Pregnancy: Care Instructions  Instrucciones de cuidado    Aunque no lo crea, vang bebé está por nacer. Es posible que ya tenga ideas acerca de la personalidad de vang bebé debido a cuánto se mueve. O cori vez haya notado cómo responde a los sonidos, al calor, al frío y a la cyrus. Incluso podría saber qué tipo de [place] a vang bebé. A estas [place], usted tiene [address] idea de qué puede esperar goyo el [place]. Es posible que haya hablado de noemi preferencias del nacimiento con vang médico. Norberto incluso si usted quiere un nacimiento por vía vaginal, es tim buena idea aprender [employer] nacimientos por cesárea. Los partos por cesárea son [employer] bebés nacen por medio de un stuart (incisión) hecho en la parte inferior del abdomen. A veces, es la mejor opción para la mirta del bebé y de [place]. Esta hoja de cuidados puede ayudarla a entender los nacimientos por cesárea. También le da información sobre qué esperar después del nacimiento de vang bebé. Y la ayuda a comprender [employer] depresión posparto. La atención de seguimiento es tim parte clave de vang tratamiento y seguridad. Asegúrese de hacer y acudir a todas las citas, y llame a vang médico si está teniendo problemas. También es tim buena idea saber los resultados de noemi exámenes y mantener tim lista de los medicamentos que kathie. ¿Cómo puede cuidarse en el hogar? Aprenda sobre el parto por cesárea  · La mayoría de los partos por cesárea no están planeados. Se hacen por problemas que se producen goyo el trabajo de [place]. Estos problemas podrían incluir:  ? El [place] de parto se vuelve más lento o se detiene. ? Presión arterial sherin u otros problemas para la madre.  ? Señales de sufrimiento del bebé. Estas señales pueden incluir tim frecuencia cardíaca muy rápida o lenta.   · [place] y los bebés están brandan después de un parto por Shereen Wolff cesárea es tim Dewanda Minus. Tiene más riesgos que un parto vaginal.  · En algunos casos, un parto por cesárea planificado puede ser más seguro que un parto vaginal. [place] puede ser el crispin si:  ? La madre tiene un problema de mirta, mesha tim afección cardíaca. ? El bebé no está en posición con la randy para abajo para el parto. Esta posición se llama de nalgas. ? El útero tiene cicatrices de cirugías anteriores. Watch[place]ower podría aumentar la probabilidad de un desgarro en el [place]. ? Hay un problema con la placenta. ? La madre tiene tim infección, mesha herpes genital, que podría transmitirse al bebé. ? La madre está embarazada con mellizos o más bebés. ? El bebé pesa de 9 a 8 libras o más. · Debido a los riesgos del parto por cesárea, las cesáreas planeadas deberían hacerse generalmente solo por motivos médicos. Y tim cesárea planeada debería hacerse en la semana 44 o más tarde javed que haya un motivo médico para hacerla antes. Sepa lo que ocurrirá después del parto y cómo planificar las primeras semanas en vang hogar  · Usted, vang bebé y vang nicole o instructor Pete Meza bandas de identificación. Solo las personas que tengan bandas que coincidan podrán retirar al bebé de la debra de recién nacidos. · Aprenderá a alimentar a vang bebé, cambiar el pañal y bañar al bebé. Rahul Huddle a cuidar del muñón del cordón umbilical. Si Sonya Stacey a circuncidar a vang bebé, también aprenderá a cuidar tim circuncisión. · Pídale a las visitas que esperen a visitarla cuando esté en vang hogar. Y pídales que se laven las destiny antes de tocar al bebé. · Asegúrese de tener otro adulto en casa por lo menos 2 o 3 días después del [place]. · Ruddy las primeras 2 semanas, limite las visitas de familiares y amigos. · No permita visitantes que tengan resfriados o infecciones. Asegúrese de que todos los visitantes estén al día con noemi vacunas. Nunca deje que nadie fume cerca de vang bebé. · Trate de dormir tim siesta cuando vang bebé duerma.   Sea consciente de la depresión posparto  · La \"melancolía de la maternidad\" es común en las primeras 1 o 2 semanas después del [place]. Es posible que tenga ganas de llorar o se sienta chantale o irritable sin motivo alguno. · En algunas mujeres, estas emociones hamilton más tiempo y son más intensas. A esto se lo conoce mesha depresión posparto. · Si noemi síntomas hamilton más de unas pocas semanas, o si se siente muy deprimida, pídale ayuda a vang médico.  · La depresión posparto sí puede tratarse. Los grupos de apoyo y la asesoría psicológica pueden ser de [facility]. A veces, los medicamentos también pueden ayudar. ¿Dónde puede encontrar más información en inglés? Vaya a http://www.gray.com/  Renato [id number] en la búsqueda para aprender más acerca de \"Semana 45 de vang embarazo: Instrucciones de cuidado. \"  Revisado: 8 octubre, 2020               Versión del contenido: 12.8  © 4431-8425 Healthwise, Incorporated. Las instrucciones de cuidado fueron adaptadas bajo licencia por Good [facility] Connections (which disclaims liability or warranty for this information). Si usted tiene Ona Stanton afección médica o sobre estas instrucciones, siempre pregunte a vang profesional de imrta. [employer], [employer] niega toda garantía o responsabilidad por vang uso de esta información.

## 2021-04-26 NOTE — PROGRESS NOTES
Return OB Visit       Subjective:   Jannette Glenn 27 y.o.   RAFY: 2021, by Ultrasound  GA:  38w3d. LOF: no  Vaginal bleeding: no  Fetal movement (after 20 weeks): yes  Contractions: yes, around every two hours, but more sporadically    Pt denies fever, chills, HA, vision disturbances, RUQ pain, chest pain, SOB, N/V/D, constipation, urinary problems, foul smelling vaginal discharge, LE Edema worse than usual.     OB History    Para Term  AB Living   4 1 1   2 1   SAB TAB Ectopic Molar Multiple Live Births   2         1      # Outcome Date GA Lbr Terrence/2nd Weight Sex Delivery Anes PTL Lv   4 Current            3 Term 12/30/15 39w3d  8 lb 4 oz (3.742 kg) F Vag-Spont EPI  MILLICENT   2 2014 11w5d          1 2010 7w0d              Allergies- reviewed:   No Known Allergies  Medications- reviewed:   Current Outpatient Medications   Medication Sig    prenatal multivit-ca-min-fe-fa tab Take  by mouth. No current facility-administered medications for this visit. Past Medical History- reviewed:  No past medical history on file.   Past Surgical History- reviewed:   Past Surgical History:   Procedure Laterality Date    HX CHOLECYSTECTOMY      GA BREAST SURGERY PROCEDURE UNLISTED       Social History- reviewed:  Social History     Socioeconomic History    Marital status: SINGLE     Spouse name: Not on file    Number of children: Not on file    Years of education: Not on file    Highest education level: Not on file   Occupational History    Not on file   Social Needs    Financial resource strain: Not on file    Food insecurity     Worry: Not on file     Inability: Not on file   [employer] needs     Medical: Not on file     Non-medical: Not on file   Tobacco Use    Smoking status: Never Smoker    Smokeless tobacco: Never Used   Substance and Sexual Activity    Alcohol use: Not Currently     Frequency: Never     Comment: social    Drug use: Never    Sexual activity: Yes Partners: Male     Birth control/protection: None   Lifestyle    Physical activity     Days per week: Not on file     Minutes per session: Not on file    Stress: Not on file   Relationships    Social connections     Talks on phone: Not on file     Gets together: Not on file     Attends Moravian service: Not on file     Active member of club or organization: Not on file     Attends meetings of clubs or organizations: Not on file     Relationship status: Not on file    Intimate partner violence     Fear of current or ex partner: Not on file     Emotionally abused: Not on file     Physically abused: Not on file     Forced sexual activity: Not on file   Other Topics Concern    Not on file   Social History Narrative    Not on file     Immunizations- reviewed:   Immunization History   Administered Date(s) Administered    Influenza Vaccine vWise) PF (>6 Mo Flulaval, Fluarix, and >3 Yrs Afluria, Fluzone 04711) 2020    Tdap 03/15/2021     Flu vaccine Done  Tdap Done    Objective:     Visit Vitals  /67 (BP 1 Location: Right upper arm, BP Patient Position: Sitting, BP Cuff Size: Adult)   Pulse 89   Temp 97.5 °F (36.4 °C) (Temporal)   Resp 16   Ht 5' 2\" (1.575 m)   Wt 169 lb 9.6 oz (76.9 kg)   LMP 2020 (Exact Date)   SpO2 98%   BMI 31.02 kg/m²       Physical Exam:  GENERAL APPEARANCE: alert, well appearing, in no apparent distress  ABDOMEN: gravid, Fundal height 36 FHT present at 150  bpm  PSYCH: normal mood and affect     Labs  No results found for this or any previous visit (from the past 12 hour(s)). Assessment         ICD-10-CM ICD-9-CM    1. 38 weeks gestation of pregnancy  Z3A.38 V22.2          Plan   27 y.o.  38w3d RAFY 2021, by Ultrasound here for return OB visit     1. SIUP at 38w3d  -PNL: A+, Ab screen neg, Hgb frc wnl, CBC w/Hb 14.5, HbsAg neg, VZV/Rubella immune, RPR/HIV NR, GC/CT neg, Ucx no growth, Pap NILM HPV neg. 1hr GTT wnl. GBS positive.   -Genetic testing: NIPT Low Risk  -Immunizations: S/p Flu and Tdap  -Ultrasound: U/S at 18w3d w/ AGA, normal anatomy, normal fluid. Cephalic confirmed on 4/15/7642 in clinic. Cephalic confirmed today. Refer to image below. -SVE today: 1-2/30/-3 (last check at 37w3d 1/30/-3)            PREGNANCY CONCERNS  None    BIRTH PLAN  · Continuity Provider: Inocente Robin  · Social: Denies Tobacco, EtoH or illict drugs. Follow up in 1 week. COVID test at next appointment. No orders of the defined types were placed in this encounter. Labor precautions discussed, including: Regular painful contractions, lasting for greater than one hour, taking your breath away; any vaginal bleeding; any leakage of fluid; or absent or decreased fetal movement. Call M.D. on call if any of these symptoms or signs occur. I have discussed the diagnosis with the patient and the intended plan as seen in the above orders. The patient has received an after-visit summary and questions were answered concerning future plans. I have discussed medication side effects and warnings with the patient as well. Informed pt to return to the office or go to the ER if she experiences vaginal bleeding, vaginal discharge, leaking of fluid, pelvic cramping.     Pt seen and discussed with Dr. Moy Oscar (attending physician)    Pamella Borrero MD  Family Medicine Resident

## 2021-04-26 NOTE — PROGRESS NOTES
Chief Complaint   Patient presents with    Routine Prenatal Visit     she is 38w 3d today. denies bleeding, leakage of fluids, and contractions. has some lower abdominal and left sided back pain - rates 5/10. can feel baby moving, is still taking prenatals     Visit Vitals  /67 (BP 1 Location: Right upper arm, BP Patient Position: Sitting, BP Cuff Size: Adult)   Pulse 89   Temp 97.5 °F (36.4 °C) (Temporal)   Resp 16   Ht 5' 2\" (1.575 m)   Wt 169 lb 9.6 oz (76.9 kg)   SpO2 98%   BMI 31.02 kg/m²     1. Have you been to the ER, urgent care clinic since your last visit? Hospitalized since your last visit? No    2. Have you seen or consulted any other health care providers outside of the [address] [place] [address] since your last visit? Include any pap smears or colon screening.  No

## 2021-04-26 NOTE — PROGRESS NOTES
I reviewed with the resident the medical history and the resident's findings on the physical examination. I discussed with the resident the patient's diagnosis and concur with the plan. [facility] @ 38w3d by 18wk scan  1.   IUP: RH pos, NIPT wnl, normal anatomy, GTT+CBC wnl, GBS pos, cephalic by magy, needs Covid    Desires OCPs  Referred to [facility]   Seen by [facility]

## 2021-04-30 ENCOUNTER — HOSPITAL ENCOUNTER (INPATIENT)
Age: 31
LOS: 2 days | Discharge: HOME | DRG: 560 | End: 2021-05-02
Attending: FAMILY MEDICINE | Admitting: OBSTETRICS & GYNECOLOGY
Payer: MEDICAID

## 2021-04-30 PROCEDURE — 75410000000 HC DELIVERY VAGINAL/SINGLE: Performed by: OBSTETRICS & GYNECOLOGY

## 2021-04-30 PROCEDURE — 65270000029 HC RM PRIVATE

## 2021-04-30 PROCEDURE — 75410000003 HC RECOV DEL/VAG/CSECN EA 0.5 HR: Performed by: OBSTETRICS & GYNECOLOGY

## 2021-04-30 PROCEDURE — 74011250636 HC RX REV CODE- 250/636: Performed by: STUDENT IN AN ORGANIZED HEALTH CARE EDUCATION/TRAINING PROGRAM

## 2021-04-30 PROCEDURE — 76060000078 HC EPIDURAL ANESTHESIA: Performed by: RADIOLOGY

## 2021-04-30 PROCEDURE — 36415 COLL VENOUS BLD VENIPUNCTURE: CPT

## 2021-04-30 PROCEDURE — 75810000275 HC EMERGENCY DEPT VISIT NO LEVEL OF CARE

## 2021-04-30 PROCEDURE — 75410000002 HC LABOR FEE PER 1 HR: Performed by: OBSTETRICS & GYNECOLOGY

## 2021-04-30 PROCEDURE — 74011000258 HC RX REV CODE- 258: Performed by: STUDENT IN AN ORGANIZED HEALTH CARE EDUCATION/TRAINING PROGRAM

## 2021-04-30 PROCEDURE — 85025 COMPLETE CBC W/AUTO DIFF WBC: CPT

## 2021-04-30 RX ORDER — OXYTOCIN/RINGER'S LACTATE 30/500 ML
87.3 PLASTIC BAG, INJECTION (ML) INTRAVENOUS AS NEEDED
Status: DISCONTINUED | OUTPATIENT
Start: 2021-04-30 | End: 2021-05-01

## 2021-04-30 RX ORDER — SODIUM CHLORIDE, SODIUM LACTATE, POTASSIUM CHLORIDE, CALCIUM CHLORIDE 600; 310; 30; 20 MG/100ML; MG/100ML; MG/100ML; MG/100ML
125 INJECTION, SOLUTION INTRAVENOUS CONTINUOUS
Status: DISCONTINUED | OUTPATIENT
Start: 2021-04-30 | End: 2021-05-01

## 2021-04-30 RX ORDER — OXYTOCIN/RINGER'S LACTATE 30/500 ML
10 PLASTIC BAG, INJECTION (ML) INTRAVENOUS AS NEEDED
Status: DISCONTINUED | OUTPATIENT
Start: 2021-04-30 | End: 2021-05-01

## 2021-04-30 RX ORDER — SODIUM CHLORIDE 0.9 % (FLUSH) 0.9 %
5-40 SYRINGE (ML) INJECTION EVERY 8 HOURS
Status: DISCONTINUED | OUTPATIENT
Start: 2021-04-30 | End: 2021-05-02 | Stop reason: HOSPADM

## 2021-04-30 RX ORDER — SODIUM CHLORIDE 0.9 % (FLUSH) 0.9 %
5-40 SYRINGE (ML) INJECTION AS NEEDED
Status: DISCONTINUED | OUTPATIENT
Start: 2021-04-30 | End: 2021-05-02 | Stop reason: HOSPADM

## 2021-04-30 RX ORDER — NALOXONE HYDROCHLORIDE 0.4 MG/ML
0.4 INJECTION, SOLUTION INTRAMUSCULAR; INTRAVENOUS; SUBCUTANEOUS AS NEEDED
Status: DISCONTINUED | OUTPATIENT
Start: 2021-04-30 | End: 2021-05-01 | Stop reason: HOSPADM

## 2021-04-30 RX ADMIN — SODIUM CHLORIDE 5 MILLION UNITS: 900 INJECTION INTRAVENOUS at 23:00

## 2021-05-01 ENCOUNTER — ANESTHESIA EVENT (OUTPATIENT)
Dept: LABOR AND DELIVERY | Age: 31
DRG: 560 | End: 2021-05-01
Payer: MEDICAID

## 2021-05-01 ENCOUNTER — ANESTHESIA (OUTPATIENT)
Dept: LABOR AND DELIVERY | Age: 31
DRG: 560 | End: 2021-05-01
Payer: MEDICAID

## 2021-05-01 LAB
BASOPHILS # BLD: 0 K/UL (ref 0–0.1)
BASOPHILS NFR BLD: 0 % (ref 0–1)
COVID-19 RAPID TEST, COVR: NOT DETECTED
DIFFERENTIAL METHOD BLD: ABNORMAL
EOSINOPHIL # BLD: 0.1 K/UL (ref 0–0.4)
EOSINOPHIL NFR BLD: 1 % (ref 0–7)
ERYTHROCYTE [DISTWIDTH] IN BLOOD BY AUTOMATED COUNT: 14 % (ref 11.5–14.5)
HCT VFR BLD AUTO: 39.4 % (ref 35–47)
HGB BLD-MCNC: 13.4 G/DL (ref 11.5–16)
IMM GRANULOCYTES # BLD AUTO: 0.1 K/UL (ref 0–0.04)
IMM GRANULOCYTES NFR BLD AUTO: 1 % (ref 0–0.5)
LYMPHOCYTES # BLD: 1.8 K/UL (ref 0.8–3.5)
LYMPHOCYTES NFR BLD: 15 % (ref 12–49)
MCH RBC QN AUTO: 30 PG (ref 26–34)
MCHC RBC AUTO-ENTMCNC: 34 G/DL (ref 30–36.5)
MCV RBC AUTO: 88.1 FL (ref 80–99)
MONOCYTES # BLD: 0.6 K/UL (ref 0–1)
MONOCYTES NFR BLD: 5 % (ref 5–13)
NEUTS SEG # BLD: 9.9 K/UL (ref 1.8–8)
NEUTS SEG NFR BLD: 78 % (ref 32–75)
NRBC # BLD: 0 K/UL (ref 0–0.01)
NRBC BLD-RTO: 0 PER 100 WBC
PLATELET # BLD AUTO: 160 K/UL (ref 150–400)
PMV BLD AUTO: 11.2 FL (ref 8.9–12.9)
RBC # BLD AUTO: 4.47 M/UL (ref 3.8–5.2)
SOURCE, COVRS: NORMAL
WBC # BLD AUTO: 12.4 K/UL (ref 3.6–11)

## 2021-05-01 PROCEDURE — 87635 SARS-COV-2 COVID-19 AMP PRB: CPT

## 2021-05-01 PROCEDURE — 74011000258 HC RX REV CODE- 258: Performed by: STUDENT IN AN ORGANIZED HEALTH CARE EDUCATION/TRAINING PROGRAM

## 2021-05-01 PROCEDURE — 75410000002 HC LABOR FEE PER 1 HR: Performed by: OBSTETRICS & GYNECOLOGY

## 2021-05-01 PROCEDURE — 2709999900 HC NON-CHARGEABLE SUPPLY

## 2021-05-01 PROCEDURE — 74011250636 HC RX REV CODE- 250/636: Performed by: OBSTETRICS & GYNECOLOGY

## 2021-05-01 PROCEDURE — 74011000250 HC RX REV CODE- 250: Performed by: STUDENT IN AN ORGANIZED HEALTH CARE EDUCATION/TRAINING PROGRAM

## 2021-05-01 PROCEDURE — 74011250636 HC RX REV CODE- 250/636: Performed by: STUDENT IN AN ORGANIZED HEALTH CARE EDUCATION/TRAINING PROGRAM

## 2021-05-01 PROCEDURE — 77030019905 HC CATH URETH INTMIT MDII -A

## 2021-05-01 PROCEDURE — 65270000029 HC RM PRIVATE

## 2021-05-01 PROCEDURE — 74011250637 HC RX REV CODE- 250/637: Performed by: STUDENT IN AN ORGANIZED HEALTH CARE EDUCATION/TRAINING PROGRAM

## 2021-05-01 RX ORDER — AMMONIA 15 % (W/V)
AMPUL (EA) INHALATION
Status: DISCONTINUED
Start: 2021-05-01 | End: 2021-05-01 | Stop reason: WASHOUT

## 2021-05-01 RX ORDER — NALOXONE HYDROCHLORIDE 0.4 MG/ML
0.4 INJECTION, SOLUTION INTRAMUSCULAR; INTRAVENOUS; SUBCUTANEOUS ONCE
Status: ACTIVE | OUTPATIENT
Start: 2021-05-01 | End: 2021-05-01

## 2021-05-01 RX ORDER — LIDOCAINE HYDROCHLORIDE AND EPINEPHRINE 15; 5 MG/ML; UG/ML
INJECTION, SOLUTION EPIDURAL
Status: SHIPPED | OUTPATIENT
Start: 2021-05-01 | End: 2021-05-01

## 2021-05-01 RX ORDER — OXYTOCIN/RINGER'S LACTATE 30/500 ML
10 PLASTIC BAG, INJECTION (ML) INTRAVENOUS AS NEEDED
Status: COMPLETED | OUTPATIENT
Start: 2021-05-01 | End: 2021-05-01

## 2021-05-01 RX ORDER — OXYTOCIN/RINGER'S LACTATE 30/500 ML
999 PLASTIC BAG, INJECTION (ML) INTRAVENOUS ONCE
Status: COMPLETED | OUTPATIENT
Start: 2021-05-01 | End: 2021-05-01

## 2021-05-01 RX ORDER — OXYTOCIN/RINGER'S LACTATE 30/500 ML
87.3 PLASTIC BAG, INJECTION (ML) INTRAVENOUS AS NEEDED
Status: COMPLETED | OUTPATIENT
Start: 2021-05-01 | End: 2021-05-01

## 2021-05-01 RX ORDER — FENTANYL/BUPIVACAINE/NS/PF 2-1250MCG
1-16 PREFILLED PUMP RESERVOIR EPIDURAL CONTINUOUS
Status: DISCONTINUED | OUTPATIENT
Start: 2021-05-01 | End: 2021-05-01

## 2021-05-01 RX ORDER — IBUPROFEN 800 MG/1
800 TABLET ORAL EVERY 8 HOURS
Status: DISCONTINUED | OUTPATIENT
Start: 2021-05-01 | End: 2021-05-02 | Stop reason: HOSPADM

## 2021-05-01 RX ORDER — EPHEDRINE SULFATE/0.9% NACL/PF 50 MG/5 ML
10 SYRINGE (ML) INTRAVENOUS
Status: DISCONTINUED | OUTPATIENT
Start: 2021-05-01 | End: 2021-05-01 | Stop reason: HOSPADM

## 2021-05-01 RX ADMIN — OXYTOCIN 87.3 MILLI-UNITS/MIN: 10 INJECTION, SOLUTION INTRAMUSCULAR; INTRAVENOUS at 10:03

## 2021-05-01 RX ADMIN — LIDOCAINE HYDROCHLORIDE AND EPINEPHRINE 3 ML: 15; 5 INJECTION, SOLUTION EPIDURAL at 02:22

## 2021-05-01 RX ADMIN — SODIUM CHLORIDE 2.5 MILLION UNITS: 9 INJECTION, SOLUTION INTRAVENOUS at 02:21

## 2021-05-01 RX ADMIN — SODIUM CHLORIDE, POTASSIUM CHLORIDE, SODIUM LACTATE AND CALCIUM CHLORIDE 125 ML/HR: 600; 310; 30; 20 INJECTION, SOLUTION INTRAVENOUS at 03:00

## 2021-05-01 RX ADMIN — SODIUM CHLORIDE, POTASSIUM CHLORIDE, SODIUM LACTATE AND CALCIUM CHLORIDE 500 ML: 600; 310; 30; 20 INJECTION, SOLUTION INTRAVENOUS at 02:30

## 2021-05-01 RX ADMIN — IBUPROFEN 800 MG: 800 TABLET, FILM COATED ORAL at 10:02

## 2021-05-01 RX ADMIN — IBUPROFEN 800 MG: 800 TABLET, FILM COATED ORAL at 17:57

## 2021-05-01 RX ADMIN — OXYTOCIN 10000 MILLI-UNITS: 10 INJECTION, SOLUTION INTRAMUSCULAR; INTRAVENOUS at 09:51

## 2021-05-01 RX ADMIN — Medication 12 ML/HR: at 03:42

## 2021-05-01 RX ADMIN — SODIUM CHLORIDE 2.5 MILLION UNITS: 9 INJECTION, SOLUTION INTRAVENOUS at 07:13

## 2021-05-01 RX ADMIN — SODIUM CHLORIDE, POTASSIUM CHLORIDE, SODIUM LACTATE AND CALCIUM CHLORIDE 1000 ML: 600; 310; 30; 20 INJECTION, SOLUTION INTRAVENOUS at 01:15

## 2021-05-01 RX ADMIN — Medication 999 ML/HR: at 08:38

## 2021-05-01 NOTE — L&D DELIVERY NOTE
Patient progressed to C/C/+2 and pushed for approximately 27 with  of liveborn female infant. Head was delivered with no nuchal cord. Anterior shoulder delivered with single maternal effort, and posterior shoulder and body followed easily. Infant placed skin to skin on maternal abdomen. Delayed cord clamping x 1 min. Cord clamped x2 and cut by father of baby. Placenta delivered spontaneously intact w/ 3 v cord. Perineum and vagina inspected- no lacerations. Fundus firm at U with minimal bleeding. Mother and baby bonding in LDR. Delivery EBL 100cc. Delivery Summary    Patient: Maryann Scruggs MRN: [id number]  SSN: [id number]    YOB: 1990  Age: 27 y.o. Sex: female       Information for the patient's :  Faye Emiliapippa [[id number]]       Labor Events:    Labor: No    Steroids: None   Cervical Ripening Date/Time:       Cervical Ripening Type: None   Antibiotics During Labor: Yes   Rupture Identifier:      Rupture Date/Time: 2021 6:10 AM   Rupture Type: AROM;Bulging   Amniotic Fluid Volume:  Moderate    Amniotic Fluid Description: Meconium    Amniotic Fluid Odor:      Induction: None       Induction Date/Time:        Indications for Induction:      Augmentation: None   Augmentation Date/Time:      Indications for Augmentation:     Labor complications: None       Additional complications:        Delivery Events:  Indications For Episiotomy:     Episiotomy: None   Perineal Laceration(s): None   Repaired:     Periurethral Laceration Location:      Repaired:     Labial Laceration Location:     Repaired:     Sulcal Laceration Location:     Repaired:     Vaginal Laceration Location:     Repaired:     Cervical Laceration Location:     Repaired:     Repair Suture: None   Number of Repair Packets:     Estimated Blood Loss (ml):  ml   Quantitative Blood Loss (ml)                Delivery Date: 2021    Delivery Time: 8:36 AM  Delivery Type: Vaginal, Spontaneous  Sex:  Female Gestational Age: 36w3d   Delivery Clinician:  Delfino Hoff  Living Status: Living   Delivery Location: L&D Room 207          APGARS  One minute Five minutes Ten minutes   Skin color: 1   1        Heart rate: 2   2        Grimace: 2   2        Muscle tone: 2   2        Breathin   2        Totals: 9   9            Presentation: Vertex    Position:   Occiput Anterior  Resuscitation Method:  Tactile Stimulation;None     Meconium Stained: Thin      Cord Information: 3 Vessels  Complications: None  Cord around:    Delayed cord clamping? Yes  Cord clamped date/time:2021  8:38 AM  Disposition of Cord Blood: Discard    Blood Gases Sent?: No    Placenta:  Date/Time: 2021  8:40 AM  Removal: Expressed      Appearance: Normal;Intact     [place] Measurements:  Birth Weight:        Birth Length:        Head Circumference:        Chest Circumference:       Abdominal Girth: Other Providers:   JUSTINE SMITH, OSMAN CONLEY;Kevin REID ANA K;;;;;;;;FATSI, VALENTINO;Jennifer PILLAI, Obstetrician;Primary Nurse;Primary [place] Nurse;Nicu Nurse;Neonatologist;Anesthesiologist;Crna;Nurse Practitioner;Midwife;Nursery Nurse;Resident;Scrub Tech;Staff Nurse           Group B Strep: No results found for: GRBSEXT, GRBSEXT  Information for the patient's :  Laura Roge [[id number]]   No results found for: ABORH, PCTABR, PCTDIG, BILI, ABORHEXT, ABORH     No results for input(s): PCO2CB, PO2CB, HCO3I, SO2I, IBD, PTEMPI, SPECTI, PHICB, ISITE, IDEV, IALLEN in the last 72 hours.

## 2021-05-01 NOTE — H&P
[address] [facility] [address] [place] [address]   Office [phone], Fax [phone]      History & Physical    Name: Roxanne Delarosa MRN: [id number]  SSN: [id number]    YOB: 1990  Age: 27 y.o. Sex: female      Subjective:     Reason for Admission:  Pregnancy and Labor    History of Present Illness: Ms. Ananth Ford is a 27 y.o.   female with an estimated gestational age of 36w3d with Estimated Date of Delivery: 21. Patient complains of contractions that started the morning of admission at 6am, occurs every 10 min. Denies LOF, vaginal discharge, headache, changes in vision, shortness of breath, chest pain, urinary symptoms. Patient can feel the baby move. OB History    Para Term  AB Living   4 1 1   2 1   SAB TAB Ectopic Molar Multiple Live Births   2         1      # Outcome Date GA Lbr Terrence/2nd Weight Sex Delivery Anes PTL Lv   4 Current            3 Term 12/30/15 39w3d  8 lb 4 oz (3.742 kg) F Vag-Spont EPI  MILLICENT   2 2014 11w5d          1 2010 7w0d            No past medical history on file. Past Surgical History:   Procedure Laterality Date    HX CHOLECYSTECTOMY      CO BREAST SURGERY PROCEDURE UNLISTED       Social History     Occupational History    Not on file   Tobacco Use    Smoking status: Never Smoker    Smokeless tobacco: Never Used   Substance and Sexual Activity    Alcohol use: Not Currently     Frequency: Never     Comment: social    Drug use: Never    Sexual activity: Yes     Partners: Male     Birth control/protection: None      Family History   Problem Relation Age of Onset    Diabetes Mother        No Known Allergies  Prior to Admission medications    Medication Sig Start Date End Date Taking? Authorizing Provider   prenatal multivit-ca-min-fe-fa tab Take  by mouth. Yes Provider, Historical        Review of Systems:  A comprehensive review of systems was negative except for that written in the History of Present Illness. Objective:     Vitals:    Vitals:    21 2300   BP: 123/78   Pulse: 95   Resp: 18   Temp: 98 °F (36.7 °C)   SpO2: 98%      Temp (24hrs), Av °F (36.7 °C), Min:98 °F (36.7 °C), Max:98 °F (36.7 °C)    BP  Min: 123/78  Max: 123/78     Physical Exam:  Patient without distress. Heart: Regular rate and rhythm or S1S2 present  Lung: clear to auscultation throughout lung fields, no wheezes, no rales, no rhonchi and normal respiratory effort  Abdomen: soft, nontender  Cervical Exam  Dilation (cm): 5  Eff: 90 %  Station: -2  Vaginal exam done by? : CHETAN Angel    Lower Extremities: No LE edema     Membranes:  Intact  Uterine Activity:  Frequency: Every 4-5 min  Fetal Heart Rate:  Reactive  Baseline: 140 per minute  Variability: moderate  Accelerations: yes       Lab/Data Review:  Recent Results (from the past 24 hour(s))   CBC WITH AUTOMATED DIFF    Collection Time: 21 10:50 PM   Result Value Ref Range    WBC 12.4 (H) 3.6 - 11.0 K/uL    RBC 4.47 3.80 - 5.20 M/uL    HGB 13.4 11.5 - 16.0 g/dL    HCT 39.4 35.0 - 47.0 %    MCV 88.1 80.0 - 99.0 FL    MCH 30.0 26.0 - 34.0 PG    MCHC 34.0 30.0 - 36.5 g/dL    RDW 14.0 11.5 - 14.5 %    PLATELET 826 059 - 555 K/uL    MPV 11.2 8.9 - 12.9 FL    NRBC 0.0 0  WBC    ABSOLUTE NRBC 0.00 0.00 - 0.01 K/uL    NEUTROPHILS 78 (H) 32 - 75 %    LYMPHOCYTES 15 12 - 49 %    MONOCYTES 5 5 - 13 %    EOSINOPHILS 1 0 - 7 %    BASOPHILS 0 0 - 1 %    IMMATURE GRANULOCYTES 1 (H) 0.0 - 0.5 %    ABS. NEUTROPHILS 9.9 (H) 1.8 - 8.0 K/UL    ABS. LYMPHOCYTES 1.8 0.8 - 3.5 K/UL    ABS. MONOCYTES 0.6 0.0 - 1.0 K/UL    ABS. EOSINOPHILS 0.1 0.0 - 0.4 K/UL    ABS. BASOPHILS 0.0 0.0 - 0.1 K/UL    ABS. IMM.  GRANS. 0.1 (H) 0.00 - 0.04 K/UL    DF AUTOMATED         Assessment and Plan:     Ms. Inna Nguyen is a 27 y.o.   female with an estimated gestational age of 36w3d who is admitted to L&D for labor    PNL: A+, Ab screen neg, Hgb frc wnl, CBC w/Hb 14.5, HbsAg neg, VZV/Rubella immune, RPR/HIV NR, GC/CT neg, Ucx no growth, Pap NILM HPV neg. 1hr GTT wnl. GBS positive. NIPT Low Risk. S/p Flu and Tdap. U/S at 18w3d w/ AGA, normal anatomy, normal fluid. Cephalic confirmed on  in clinic. Labor: Patient presents to L&D due to contractions. On cervical check, patient is 5 cm dilated. - Admit to L&D  - Vitals per unit protocol  - Cephalic confirmed in clinic (2021).    - Anticipating  of a baby girl. GBS positive status: GBS positive on .   - Give Penicillin G 5 million units IV once, followed by 2.5 million units every 4 hours. I have discussed the diagnosis with the patient and the intended plan as seen in the above orders. All questions were answered concerning future plans. I have discussed medication side effects and warnings with the patient as well. Labor precautions discussed, including: Regular painful contractions, lasting for greater than one hour, taking your breath away; any vaginal bleeding; any leakage of fluid; or absent or decreased fetal movement. Call M.D. on call if any of these symptoms or signs occur.       Patient discussed with Dr. Lani Chapa MD  Family Medicine Resident

## 2021-05-01 NOTE — PROGRESS NOTES
Labor progress note:     Patient is receiving fluids, had painful contractions.  Wants epidural.     BP (!) 100/58   Pulse (!) 106   Temp 98 °F (36.7 °C)   Resp 18   LMP 07/12/2020 (Exact Date)   SpO2 97%   Breastfeeding No     Cervical Exam  Dilation (cm): 8  Eff: 90 %  Station: -2  Vaginal exam done by? : Stanley RN    [place]: q3m  FHR: BL 130s with accels no decels     Plan:   - Epidural  - Next dose of PNC at 2.30-3 am  - IVF  - Expectant management     Bahman Jordan MD

## 2021-05-01 NOTE — PROGRESS NOTES
8371: SBAR report received from SHEELA Holm RN. 2342: Trial push with Dr. Skye Parker at bedside. Minimal movement from fetus noted. Verbal orders to continue laboring down at this time. MD aware of variables with contractions. 1224: Straight cath for 100cc lalitha colored urine. 3681: Pt repositioned to throne position. 7640: Pt vomiting.    0802: Dr. Musa Salinas at bedside. SVE done. 10/100/+2. Verbal orders to begin pushing.     0806: Patient actively pushing. RN and Dr. Lashanda Edouard remains in continuous attendance at the bedside. Assessment & evaluation of fetal heart rate ongoing via continuous EFM.    9253: RN remained at bedside throughout pushing. EFM continuously assessed. Vaginal delivery of viable infant. 5875: Pt states she has a history of passing out when ambulating to bathroom post delivery or miscarriage. Will straight cath at this time and defer ambulating to bathroom until later time. 1227: Assisting pt with breastfeeding. Pt doing well. No complaints at this time. 1415: Pt ambulated to bathroom without difficulty or c/o dizziness. Voided large amount of urine. Pericare done. Pt ambulated back to bed without incident. 1740: TRANSFER - OUT REPORT:    Verbal report given to LU Blake RN(name) on Ramiro Longo  being transferred to MIU(unit) for routine progression of care       Report consisted of patients Situation, Background, Assessment and   Recommendations(SBAR). Information from the following report(s) SBAR, Kardex, Procedure Summary, Intake/Output and MAR was reviewed with the receiving nurse. Lines:   Peripheral IV 04/30/21 Right Forearm (Active)   Site Assessment Clean, dry, & intact 05/01/21 0255   Phlebitis Assessment 0 05/01/21 0255   Infiltration Assessment 0 05/01/21 0255   Dressing Status Clean, dry, & intact 05/01/21 0255   Dressing Type Transparent;Tape 05/01/21 0255   Hub Color/Line Status Green; Infusing 05/01/21 0255   Action Taken Blood drawn 05/01/21 0000   Alcohol Cap Used Yes 05/01/21 0256        Opportunity for questions and clarification was provided.       Patient transported with:   Registered Nurse  Tech

## 2021-05-01 NOTE — ROUTINE PROCESS
Bedside and Verbal shift change report given to dillon artis rn (oncoming nurse) by keith stone rn (offgoing nurse). Report included the following information SBAR, Kardex, Procedure Summary, Intake/Output, MAR, Accordion and Recent Results.

## 2021-05-01 NOTE — LACTATION NOTE
This note was copied from a baby's chart. Reviewed breastfeeding basics:  Supply and demand,  stomach size, early  Feeding cues, skin to skin, positioning and baby led latch-on,  latched with signs of good, deep latch vs shallow, feeding frequency and duration, and log sheet for tracking infant feedings and output. Breastfeeding Booklet and Warm line information given. Discussed typical  weight loss and the importance of infant weight checks with pediatrician 1-2 post discharge. Hand Expression Education:  Mom taught how to manually hand express her colostrum. Emphasized the importance of providing infant with valuable colostrum as infant rests skin to skin at breast.  Aware to avoid extended periods of non-feeding. Aware to offer 10-20+ drops of colostrum every 2-3 hours until infant is latching and nursing effectively. Taught the rationale behind this low tech but highly effective evidence based practice. Many drops noted. Discussed with mother her plan for feeding. Reviewed the benefits of exclusive breast milk feeding during the hospital stay. Informed her of the risks of using formula to supplement in the first few days of life as well as the benefits of successful breast milk feeding; referred her to the Breastfeeding booklet about this information. She acknowledges understanding of information reviewed and states that it is her plan to both breast feed and   Formula feed her infant. Will support her choice and offer additional information as needed. Pt will successfully establish breastfeeding by feeding in response to early feeding cues   or wake every 3h, will obtain deep latch, and will keep log of feedings/output. Taught to BF at hunger cues and or q 2-3 hrs and to offer 10-20 drops of hand expressed colostrum at any non-feeds.       Breast Assessment  Left Breast: Medium, Large  Left Nipple: Everted, Intact  Right Breast: Medium, Large  Right Nipple: Everted, Intact  Breast- Feeding Assessment  Attends Breast-Feeding Classes: No  Breast-Feeding Experience: Yes(4 months wth first)  Breast Trauma/Surgery: Yes(had cyst removed from side of right bresat, benign)  Type/Quality: Fair(per mom, latches but slips off. Not seen at breast)         Not seen at breast.  Shown how to gt a deeper latch and discussed checking for flanged lips.

## 2021-05-01 NOTE — PROGRESS NOTES
Patient complete and started pushing- found to be with anterior lip. Baby head OP. Lip was reduced and baby manually rotated, however cervical lip protruding again.  Will put patient on peanut ball and recheck in 1hr.

## 2021-05-01 NOTE — ANESTHESIA PROCEDURE NOTES
CSE Block    Start time: 5/1/2021 2:00 AM  End time: 5/1/2021 2:28 AM  Performed by: Jacki Bedolla DO  Authorized by: Jacki Bedolla DO     Pre-Procedure  Indications: procedure for pain    preanesthetic checklist: patient identified, risks and benefits discussed, anesthesia consent, site marked, patient being monitored and timeout performed      Procedure:   Patient Position:  Seated  Prep: Betadine and patient draped    Location:  L1-2    Epidural Needle:   Needle Type:  Tuohy  Needle Gauge:  17 G  Injection Technique:  Loss of resistance using saline  Attempts:  1    Spinal Needle:   Needle Type:  Pencan    Catheter:   Catheter Type:  Flex-tip  Catheter Size:  18 G  Catheter at Skin Depth (cm):  12  Depth in Epidural Space (cm):  6  Events: no blood with aspiration, no paresthesia, negative aspiration test and CSF confirmed    Test Dose:  Negative    Assessment:   Catheter Secured:  Tegaderm and tape  Insertion:  Uncomplicated  3cc of 7.53% Bupivicaine injected in IT space

## 2021-05-01 NOTE — ANESTHESIA PREPROCEDURE EVALUATION
Relevant Problems   No relevant active problems       Anesthetic History   No history of anesthetic complications            Review of Systems / Medical History  Patient summary reviewed, nursing notes reviewed and pertinent labs reviewed    Pulmonary  Within defined limits                 Neuro/Psych   Within defined limits           Cardiovascular  Within defined limits                     GI/Hepatic/Renal  Within defined limits              Endo/Other  Within defined limits           Other Findings   Comments: GBS+           Physical Exam    Airway  Mallampati: II  TM Distance: 4 - 6 cm  Neck ROM: normal range of motion   Mouth opening: Normal     Cardiovascular  Regular rate and rhythm,  S1 and S2 normal,  no murmur, click, rub, or gallop             Dental  No notable dental hx       Pulmonary  Breath sounds clear to auscultation               Abdominal         Other Findings            Anesthetic Plan    ASA: 2  Anesthesia type: CSE      Post-op pain plan if not by surgeon: indwelling epidural catheter      Anesthetic plan and risks discussed with: Patient

## 2021-05-01 NOTE — PROGRESS NOTES
2200:  Patient arrived to unit with complaints of contractions every 10 minutes since 6am.  Patient oriented to room, connected to external fetal monitors and triage flow-sheet complete. She reports good fetal movement and denies vaginal bleeding and leaking of fluid. SVE by RN 5/90/-1. Residents called and Fritz Izaguirre MD notified. Patient states her  will be here shortly and her two friends at bedside will leave then. Plan for spouse/father to have 2nd armband. 0:  Consents competed and admission database done with use of  # [id number].     0200Chris Lugo MD at bedside for epidural placement. Timeout performed. 0230:  Patient comfortable after CSE placement. [phone] Perera MD and Krista Mendez MD at bedside. AROM performed for thin meconium and patient started pushing. RN remains at bedside during pushing continually assessing patient and monitoring fetal heart tracing. MD's remain at bedside. Baby positioned in OP position, Fritz Izaguirre MD attempted to turn fetus but anterior lip was not reducible. 2540:  Patient positioned in left lateral position with peanut ball and instructed to call with any increasing rectal pressure. 0700: Bedside and Verbal shift change report given to ARIEL Fitzpatrick (oncoming nurse) by Cyndie Holm RN (offgoing nurse). Report included the following information SBAR, Kardex, Intake/Output, MAR, Accordion and Recent Results.

## 2021-05-02 VITALS
TEMPERATURE: 97.6 F | OXYGEN SATURATION: 96 % | RESPIRATION RATE: 16 BRPM | HEART RATE: 92 BPM | DIASTOLIC BLOOD PRESSURE: 64 MMHG | SYSTOLIC BLOOD PRESSURE: 109 MMHG

## 2021-05-02 PROCEDURE — 74011250637 HC RX REV CODE- 250/637: Performed by: STUDENT IN AN ORGANIZED HEALTH CARE EDUCATION/TRAINING PROGRAM

## 2021-05-02 RX ORDER — IBUPROFEN 800 MG/1
800 TABLET ORAL EVERY 8 HOURS
Qty: 30 TAB | Refills: 0 | Status: SHIPPED | OUTPATIENT
Start: 2021-05-02

## 2021-05-02 RX ADMIN — IBUPROFEN 800 MG: 800 TABLET, FILM COATED ORAL at 01:59

## 2021-05-02 RX ADMIN — IBUPROFEN 800 MG: 800 TABLET, FILM COATED ORAL at 10:22

## 2021-05-02 NOTE — ROUTINE PROCESS
Patient off unit in stable condition via wheelchair with volunteers for discharge home per  MD.  Patient is to follow up in 6 weeks and is aware. Prescriptions called to patient pharmacy    Patient denies H/A, dizziness, nausea and or vomiting or pain at this time. Infant in car seat with mom.

## 2021-05-02 NOTE — DISCHARGE SUMMARY
Chief Complaint   Patient presents with   • Anxiety   • Derm Problem     sun spots on arms        SUBJECTIVE:  This is a 45 year old female who presents with a couple of issues today.  First is that her anxiety disorder has gotten a little more unmanageable lately.  She's having a lot of stress through her job.  She's describing tenseness, and worry, and inability to sleep.  She's been on SSRIs in the past, but doesn't like them because of weight gain.  She can't really remember if they helped her moods, but she was on them for quite a while so I suspect that they did.  She has lorazepam to use as needed, but it makes her too drowsy and she doesn't really like it.  She denies any suicidal ideation or intent, or significant blue moods or sadness.    Also had a scaly lesion on her right forearm, which is now smoothed out and she would like looked at.    Patient Active Problem List    Diagnosis Date Noted   • Recurrent UTI 2016     Priority: Low   • Chronic anxiety 2016     Priority: Low   • Pelvic pain in female 2015     Priority: Low   • Generalized abdominal pain 10/09/2015     Priority: Low   • Chronic pelvic pain in female 2015     Priority: Low   • Fatigue 2013     Priority: Low   • Dysmenorrhea 2013     Priority: Low   • Leiomyoma, benign paratubal cyst 2013     Priority: Low     2013 - Referred to GYN Oncology by Dr.Todd Squires [phone] r/t severe pelvic pain & to discuss role of robotic surgery.   PCP: Dr. Jessica Leon [phone].    Surgery 9/3/2013   1. Exploratory Laparotomy Total Abdominal Hysterectomy, bilateral salpingectomy, left ovarian cystectomy per Dr. Darion Pillai.  2. Bilateral external ureteral stent placement per Dr Devlin.  Pathology: Benign.  Pelvic Washings: No Malignant cells identified.    Imagin13 -Pelvic Ultrasound: IMPRESSION: Interval resolution of the complex cyst/follicle, left ovary, when compared to the prior study of  Obstetrical Discharge Summary     Name: Kalen Muir MRN: [id number]  SSN: [id number]    YOB: 1990  Age: 27 y.o. Sex: female      Admit Date: 2021    Discharge Date: 2021     Admitting Physician: Benson Melendez MD     Attending Physician:  Suzzanne Heimlich, DO     Admission Diagnoses: Vaginal delivery [O80]    Discharge Diagnoses:   Information for the patient's :  [place] [[id number]]   Delivery of a 7 lb 14.6 oz (3.59 kg) female infant via Vaginal, Spontaneous on 2021 at 8:36 AM  by Dena Moore. Apgars were 9  and 9 . Additional Diagnoses:   Hospital Problems  Date Reviewed: 2021          Codes Class Noted POA    Vaginal delivery ICD-10-CM: O80  ICD-9-CM: 836  2021 Unknown           No results found for: RUBELLAEXT, GRBSEXT, RUBELLAEXT, GRBSEXT    Immunization(s):   Immunization History   Administered Date(s) Administered    Influenza Vaccine exoro system) PF (>6 Mo Flulaval, Fluarix, and [facility] 2020    Tdap 03/15/2021        Hospital Course:   Patient is a 27 y.o. [id number] s/p  at 39 weeks 5 days. Presented for  Active Labor. Pregnancy uncomplicated. Labor was complicated by GBS positive status- received intrapartum penicillin. Delivered TLFI by  without complications. Normal hospital course following the delivery. On day of discharge patient reported minimal lochia, well controlled pain, and no other complaints. Discharged with pain regimen and bowel regimen. Advised to continue prenatal vitamins. Follow up with OB/PCP in 6 weeks.      Depression Scale: 9      Follow up test at discharge: none  Condition at Discharge:  stable  Disposition: Discharge to Home    Physical exam:  Visit Vitals  /64 (BP 1 Location: Left upper arm, BP Patient Position: At rest)   Pulse 92   Temp 97.6 °F (36.4 °C)   Resp 16   LMP 2020 (Exact Date)   SpO2 96%   Breastfeeding No       Exam:  Patient without 5/16/13. Mildly thickened endometrium at 1.4 cm, which is slightly diminished from the prior study at 1.6 cm. A trace amount of free fluid is noted, consistent with a physiologic amount.    05/15/2013: CT A/P:  IMPRESSION: 1. No Urinary tract calculi identified. There is mild prominence renal pelvises bilaterally which can be variation. Generalized hydronephrosis or perirenal edema is not seen. 2. The appendix is not diagnostically seen on this exam.  3. There is mild pelvic peritoneal fluid. Uterus and adnexa have limited assessment with the CT technique. There is left adnexal low-attenuation or cystic area of 2 cm and punctate calcification.    Pap:  04/26/2012= Negative for intraepithelial lesion or malignancy, additional findings: Endometrial cells present.  HPV -.        • Lumbago 01/07/2013     Priority: Low   • Migraines 04/19/2012     Priority: Low   • Ovarian cyst 04/19/2012     Priority: Low       Current Outpatient Prescriptions   Medication Sig Dispense Refill   • estradiol (ESTRACE VAGINAL) 0.1 MG/GM vaginal cream Place 2 g vaginally twice a week. 42.5 g 3   • nortriptyline (PAMELOR) 25 MG capsule Take 1 capsule by mouth nightly. 30 capsule 5   • triamcinolone (ARISTOCORT) 0.1 % ointment Apply to affected area b.i.d. (twice daily) . 30 g 2   • nitrofurantoin, macrocrystal-monohydrate, (MACROBID) 100 MG capsule Take post coital as needed 20 capsule 5   • ascorbic acid (VITAMIN C) 250 MG tablet Take 1 tablet by mouth daily. 30 tablet 11   • LORazepam (ATIVAN) 1 MG tablet Take 1 tablet by mouth every 6 hours as needed for Anxiety. 30 tablet 3   • busPIRone (BUSPAR) 5 MG tablet Take 1 tablet by mouth 2 times daily as needed (anxiety). 40 tablet 1   • venlafaxine XR (EFFEXOR XR) 37.5 MG 24 hr capsule Take 1 a day in the morning for 7 days, then 2 capsules once daily. 60 capsule 1     No current facility-administered medications for this visit.        ALLERGIES:   Allergen Reactions   • Vicodin  distress. CTAB, no w/r/r/c               RRR, + S1 and S2, no m/r/g               Abdomen soft, fundus firm at level of umbilicus, non tender               Perineum with normal lochia noted. Lower extremities are negative for swelling, cords or tenderness. Patient Instructions:     Current Discharge Medication List      START taking these medications    Details   ibuprofen (MOTRIN) 800 mg tablet Take 1 Tab by mouth every eight (8) hours. Qty: 30 Tab, Refills: 0      docusate sodium (COLACE) 50 mg capsule Take 1 Cap by mouth two (2) times a day. Qty: 20 Cap, Refills: 0         CONTINUE these medications which have NOT CHANGED    Details   prenatal multivit-ca-min-fe-fa tab Take  by mouth. Reference my discharge instructions. Follow-up Information     Follow up With Specialties Details Why [facility] Schedule an appointment as soon as possible for a visit Amara Phelps por keely Felipe. Necesita tim niels para usted en 6 semanas [address]   [place] [address] [phone]            Signed By:  Chris Berry MD    Family Medicine Resident [Hydrocodone-Acetaminophen] Other (See Comments)     Urinary retention       I have reviewed the patient's medications and allergies, past medical, surgical, social and family history, updating these as appropriate.  See Histories section of the EMR for a display of this information.    OBJECTIVE:    Visit Vitals   • /76   • Pulse 102   • Wt 64.5 kg   • LMP 08/19/2013   • BMI 22.95 kg/m2       BP Readings from Last 4 Encounters:   05/08/17 114/76   04/11/17 110/68   03/28/17 112/70   03/08/17 116/57       Wt Readings from Last 4 Encounters:   05/08/17 64.5 kg   01/23/17 63 kg   09/26/16 61.8 kg   08/16/16 59.5 kg       In general she's alert, cooperative and in no acute distress.  Skin: Flat light tan macule approximately 5 millimeters in diameter at the right dorsal forearm.  No evidence of induration or inflammation or scale.  Psychiatric: She is neatly groomed, well attired.  No suicidal ideation.  Mood is depressed, affect is somewhat sad.    ASSESSMENT AND PLAN:    #1.  Generalized anxiety disorder, with some flavored depression today: Discussed use of SNRI medication, and she was interested in trying that.  Will start venlafaxine at 37.5 milligrams daily for one week then 75 milligrams daily.  She'll follow-up with her primary physician a proximally 4 weeks.  Strongly recommended psychotherapy, and she has an EAP referral from her work site that she'll use.  For as needed use for anxiety will have her try buspirone 5 milligrams twice a day as needed.  This should not cause drowsiness.    #2.  Solar lentigo: Simple observation.  If it scales again she'll come on in and we can take a look at it.

## 2021-05-02 NOTE — DISCHARGE INSTRUCTIONS
Patient Discharge Instructions    Arletta Pedro / [id number] : 1990    Admitted 2021 Discharged: 2021       Por favor tenga manisha documento presente en vang niels de seguimiento con vang médico primario. Primary care provider:  Mac Rai DO    Discharging provider:  Chris Berry MD  - Family Medicine Resident  Bud Oms, MD -  OBGYN attending          Clary Saez:  Vaginal delivery Jovana Fuentes:     Follow-up Information     Follow up With Specialties Details [address] Schedule an appointment as soon as possible for a visit Amara Phelps por la saleem [place]. Necesita tim niels para usted en 6 semanas [address] [facility] [address]  [phone]          Continue Tratamiento:  - Por favor continue Motrin 800 mg, 1 tableta cada 8 horas por los próximos 2 días, luego 1 tableta cada 8 horas mientras sea necesario para el dolor.  - Por favor continue Colace 100 mg para el estreñimiento, tome 1 tableta dos veces al día hasta que pueda defercar regularmente sin necesidad del medicamento. - Por favor continue tomando noemi vitaminas prenatales. Pruebas de seguimiento que necesita: ninguna    Resultados pendientes:   En el momento de vang de sherin los Omro de las siguientes pruebas están pendiente:  ninguna. Por favor discuta estos resultados con vang proveedor primario en vang niels de seguimiento. Importante que [employer] siguientes síntomas: dolor de [place], falta de [place], [place], escalofríos, náusea, vómito, diarrea, cambios en vang estado mental, caídas, debilidad y sangrado. DIETA/que comer:  Regular    ACTIVIDAD FÍSICA:   Instrucciones de [facility]    No levante nada que sea más pesado que vang bebé por las próximas 6 semanas. No insertar nada por la vaginal por 6 semanas. Luego del parto por cesárea/ vaginal debe evitar tener relaciones sexuales por 6 semanas. [place] Pod niels de seguimiento posparto a las 6 semanas. Puede manejar vang vehículo mientras no esté tomando Percocet ni ningún medicamento nárcotico (Motrin está brandan). Cuidado de Her[place]:  llene el ant bottle con agua tibia y exprima hasta que salga un chorro de agua por la boquilla para ayudarle a limpiar el área perineal.      Entiendo que si surge algún problema cuando llegue a mi hogar puedo contactar a mi médico.    Me larsen explicado las siguientes instrucciones y larsen aclarado mis dudas. Entiendo y reconozco las instrucciones brindadas.                                                                                                                                                Firma de Cristal Palak / Shanika Barnes                                                                 Fecha/Hora                                                                                                                                              Firma de paciente ó representante                                                         Fecha/Hora

## 2021-05-02 NOTE — PROGRESS NOTES
[address] [facility] [address] [place] [address]   Office [phone], Fax [phone]                                Post-Partum Day Number 1 Progress Note    Patient doing very well post-partum without complaint. Pain well controlled. Lochia minimal. She is eating, voiding, and ambulating without difficulty. Vitals:    Patient Vitals for the past 8 hrs:   BP Temp Pulse Resp   21 0045 (!) 95/56 98 °F (36.7 °C) 86 16     Temp (24hrs), Av.1 °F (36.7 °C), Min:98 °F (36.7 °C), Max:98.2 °F (36.8 °C)      Exam:  Patient without distress. CTAB, no w/r/r/c.               RRR, +S1 and S2, no m/r/g. Abdomen soft, fundus firm at level of umbilicus, nontender. Perineum with normal lochia noted. Lower extremities:  No edema. No palpable cords or tenderness. Lab/Data Review:  No results found for this or any previous visit (from the past 12 hour(s)). Assessment and Plan:    Cam Klein is a 27 y.o. [id number] s/p  at 36w3d. Pregnancy was complicated by GBS positive status- received intrapartum penicillin. Patient appears to be having uncomplicated post-partum course. PPD1 Doing well.     - Continue routine perineal care and maternal education. Patient discussed with Dr. Angelo Oscar.                  Ochoa Betancur MD  Family Medicine Resident

## 2021-07-12 ENCOUNTER — ROUTINE PRENATAL (OUTPATIENT)
Dept: FAMILY MEDICINE CLINIC | Age: 31
End: 2021-07-12
Payer: MEDICAID

## 2021-07-12 VITALS
WEIGHT: 144.4 LBS | BODY MASS INDEX: 26.57 KG/M2 | HEIGHT: 62 IN | OXYGEN SATURATION: 97 % | HEART RATE: 87 BPM | SYSTOLIC BLOOD PRESSURE: 108 MMHG | DIASTOLIC BLOOD PRESSURE: 66 MMHG | TEMPERATURE: 98.7 F | RESPIRATION RATE: 18 BRPM

## 2021-07-12 PROCEDURE — 0503F POSTPARTUM CARE VISIT: CPT | Performed by: STUDENT IN AN ORGANIZED HEALTH CARE EDUCATION/TRAINING PROGRAM

## 2021-07-12 NOTE — PROGRESS NOTES
[address] [facility] Christin Vill[address]va [address]   Office [phone], Fax [phone]    Subjective:     History provided by patient     6 Week Post Partum Note    27 y.o. female [id number], presenting for 6 week postpartum visit s/p  at 39w5d. Labor was complicated by GBS positive status- received intrapartum penicillin. Patient doing well post-partum without significant complaint. Delivered: 2021. Lochia: normal  Pain: controlled  Baby: doing well, has seen PCP  Sexual activity: No  Plan for contraception: Thinking about birth control pills, but not sure. Breast/bottle: Both  Support from FOB/family:  and pt's mother  Symptoms of depression: No suicidal ideation. [place] Depression Scale Score: 6 (Scanned into media)    SocHx. - Denies smoking, alcohol use, illcit drug use  - Sexually active: No  - Occupation: Stays at home    ROS: Per HPI      Objective:     Visit Vitals  /66 (BP 1 Location: Right upper arm, BP Patient Position: Sitting, BP Cuff Size: Adult)   Pulse 87   Temp 98.7 °F (37.1 °C) (Oral)   Resp 18   Ht 5' 2\" (1.575 m)   Wt 144 lb 6.4 oz (65.5 kg)   SpO2 97%   BMI 26.41 kg/m²           Exam:  Patient without distress. Abdomen soft, fundus firm at level of umbilicus, nontender. Skin: low transverse  incision is clean dry and intact. No sign of infection. Lower extremities:  No edema. No palpable cords or tenderness. Assessment and orders:     Assessment and Plan:    Julia Valenzuela is a 27 y.o. [id number] s/p  at 39w5d. Patient having uncomplicated post-partum course. 1. Continue routine care  2. Call clinic or make appointment for symptoms of sadness  3. Follow up for yearly well woman exam.        Pt was discussed with Dr Bridgette Kraus (attending physician). I have reviewed patient medical and social history and medications. I have reviewed pertinent labs results and other data.  I have discussed the diagnosis with the patient and the intended plan as seen in the above orders. The patient has received an after-visit summary and questions were answered concerning future plans. I have discussed medication side effects and warnings with the patient as well.     Yohan Mathur MD  Resident TELECARE [facility]

## 2021-07-12 NOTE — PROGRESS NOTES
Identified pt with two pt identifiers(name and ). Reviewed record in preparation for visit and have obtained necessary documentation. Chief Complaint   Patient presents with   [address]     patient is here for a postpartum visit. she delivered on 21. she breast and bottle feeding - denies swelling, redness, tenderness, and drainage. no concerns today        Health Maintenance Due   Topic    Hepatitis C Screening     COVID-19 Vaccine (1)           Coordination of Care Questionnaire:  :   1) Have you been to an emergency room, urgent care, or hospitalized since your last visit? If yes, where when, and reason for visit? yes - 21 to deliver      2)  Have seen or consulted any other health care provider since your last visit? If yes, where when, and reason for visit?   NO

## 2022-03-19 PROBLEM — Z34.90 PREGNANCY: Status: ACTIVE | Noted: 2020-11-03

## 2022-03-19 PROBLEM — O99.820 GBS (GROUP B STREPTOCOCCUS CARRIER), +RV CULTURE, CURRENTLY PREGNANT: Status: ACTIVE | Noted: 2021-04-19

## 2022-03-30 ENCOUNTER — TELEPHONE (OUTPATIENT)
Dept: FAMILY PLANNING/WOMEN'S HEALTH CLINIC | Age: 32
End: 2022-03-30

## 2022-03-30 NOTE — TELEPHONE ENCOUNTER
Spoke with patient on phone to screen for EWL. Overview of program given. Eligibility criteria reviewed. Client does meet all qualifications and does want to enter into program. Will schedule EWL appointment.

## 2022-03-30 NOTE — TELEPHONE ENCOUNTER
Called patient to move he appt to a sooner date. Patient confirmed that she will be there. Text message with appointment information has been sent.

## 2022-04-05 ENCOUNTER — TRANSCRIBE ORDER (OUTPATIENT)
Dept: SCHEDULING | Age: 32
End: 2022-04-05

## 2022-04-05 DIAGNOSIS — N63.0 BREAST MASS: Primary | ICD-10-CM

## 2022-04-06 ENCOUNTER — HOSPITAL ENCOUNTER (OUTPATIENT)
Dept: MAMMOGRAPHY | Age: 32
Discharge: HOME | End: 2022-04-06

## 2022-04-06 ENCOUNTER — TRANSCRIBE ORDER (OUTPATIENT)
Dept: MAMMOGRAPHY | Age: 32
End: 2022-04-06

## 2022-04-06 ENCOUNTER — OFFICE VISIT (OUTPATIENT)
Dept: FAMILY PLANNING/WOMEN'S HEALTH CLINIC | Age: 32
End: 2022-04-06

## 2022-04-06 DIAGNOSIS — N63.10 MASS OF MULTIPLE SITES OF RIGHT BREAST: Primary | ICD-10-CM

## 2022-04-06 DIAGNOSIS — N63.10 MASS OF MULTIPLE SITES OF RIGHT BREAST: ICD-10-CM

## 2022-04-06 DIAGNOSIS — N63.0 BREAST MASS IN FEMALE: Primary | ICD-10-CM

## 2022-04-06 PROCEDURE — 77066 DX MAMMO INCL CAD BI: CPT

## 2022-04-06 PROCEDURE — 76642 ULTRASOUND BREAST LIMITED: CPT

## 2022-04-06 NOTE — PROGRESS NOTES
EVERY WOMANS LIFE HISTORY QUESTIONNAIRE       No Yes Comments   Has a doctor ever seen or felt anything wrong with your breast? []                                  [x]                                  Right breast, provider felt lump. Have you ever had a breast biopsy? []                                  [x]                                  2012 right biopsy kari, benign        When and where was last [facility] performed? [facility] [address]  in [place], near [address] you ever been told that there was a problem on your mammogram?   No Yes Comments   []                                  [x]                                  [place]     Do you have breast implants? No Yes Comments   [x]                                  []                                       When was your last Pap test performed? 11/2021    Have you ever had an abnormal Pap test?   No Yes Comments   [x]                                  []                                       Have you had a hysterectomy? No Yes Comments (why)   [x]                                  []                                       Have you been through menopause? No Yes Date of LMP   [x]                                  []                                  3/7/22     Did your mother take IZZY? No Yes Unknown   []                                  []                                  x     Do you have a history of HIV exposure? No Yes    [x]                                  []                                       Have you ever been diagnosed with any type of Cancer   No Yes Comments (type,when,where,type of treatment   [x]                                  []                                          Has a family member been diagnosed with breast or ovarian cancer?    No Yes Comments (which family members, and type   [x]                                  []                                       Are you taking hormone replacement therapy (HRT) No Yes Comments   [x]                                  []                                       How many times have you been pregnant? 3      Number of live births ? 2    Are you experiencing any of the following? No Yes Comments   Nipple Discharge []                                  [x]                                  Sometimes clear discharge to nipple, bilateral   Breast Lump/Masses []                                  [x]                                  Right breast lump, no pain, pt had biopsy to right breast in 20112   Breast Skin Changes [x]                                  []                                          No Yes Comments   Vaginal Discharge [x]                                  []                                     Abnormal/unusual vaginal bleeding [x]                                  []                                         Are you experiencing any other health problems? Age at first period? 14  Age at first [address]    Ht--5'2\"    Wt--136    [facility]  number is [id number].

## 2022-04-06 NOTE — PROGRESS NOTES
Assessment/Plan:    Diagnoses and all orders for this visit:    1. Breast mass in female            [address] [facility], JAYASHREE  Angelo Mcelroy expressed understanding of this plan. An AVS was printed and given to the patient.      ----------------------------------------------------------------------    Chief Complaint   Patient presents with    Well Woman       History of Present Illness:  EWL referral for dx imaging right breast mass. The pt states that her PCP found it on exam. Her breast hx is that she had a benign bx to [place] right breast [place]. The pt is not certain if this is the same mass because she was not checking it through the years  She is , she breast fed for 6 months her last baby- stopped breast feeding 6 months ago. Did not have any unusual sxs with breast feeding only the normal soreness associated with nursing in the beginning  She is on OCP for birth control       No past medical history on file. Current Outpatient Medications   Medication Sig Dispense Refill    ibuprofen (MOTRIN) 800 mg tablet Take 1 Tab by mouth every eight (8) hours. (Patient not taking: Reported on 2021) 30 Tab 0    docusate sodium (COLACE) 50 mg capsule Take 1 Cap by mouth two (2) times a day. (Patient not taking: Reported on 2021) 20 Cap 0    prenatal multivit-ca-min-fe-fa tab Take  by mouth. (Patient not taking: Reported on 2022)         No Known Allergies    Social History     Tobacco Use    Smoking status: Never Smoker    Smokeless tobacco: Never Used   Substance Use Topics    Alcohol use: Not Currently     Comment: social    Drug use: Never       Family History   Problem Relation Age of Onset    Diabetes Mother        Physical Exam:     Visit Vitals  LMP 2022   Breastfeeding No       A&Ox3  WDWN NAD  Respirations normal and non labored  Breast exam- keyona neg for dimpling, retractions, skin color changes or nipple changes.  Right upper outer quad with a 3 cm by 3-4 cm cystic feeling mobile mass present

## 2022-06-09 ENCOUNTER — OFFICE VISIT (OUTPATIENT)
Dept: SURGERY | Age: 32
End: 2022-06-09
Payer: MEDICAID

## 2022-06-09 VITALS — HEIGHT: 62 IN | WEIGHT: 144 LBS | BODY MASS INDEX: 26.5 KG/M2

## 2022-06-09 DIAGNOSIS — N63.11 MASS OF UPPER OUTER QUADRANT OF RIGHT BREAST: Primary | ICD-10-CM

## 2022-06-09 PROCEDURE — 99202 OFFICE O/P NEW SF 15 MIN: CPT | Performed by: SURGERY

## 2022-06-09 NOTE — PROGRESS NOTES
HISTORY OF PRESENT ILLNESS  Hanny Guadarrama is a 34 y.o. female. HPI NEW patient referral for consultation by Johny Arias NP [address] [place] [address] for a palpable RIGHT breast lump. She found the lump 4 months ago. The lump has not changed and is not tender to palpation. Mammogram and ultrasound 4/2022 were normal.  She had RIGHT breast spontaneous nipple discharge in the past but none lately. The discharge was clear. No nipple inversion. No family history of breast or ovarian cancer  [facility] Results (most recent):  Results from Hospital Encounter encounter on 04/06/22    [facility] MAMMO BI DX INCL CAD    Narrative  History: Palpable abnormality right breast.    Bilateral digital diagnostic mammography, interpreted in conjunction with CAD  over-read,  was performed. The breast parenchymal pattern is heterogeneously  dense. There are no suspicious masses or microcalcifications. Real-time ultrasonography of the palpable abnormality at the 10:00 position of  the right breast demonstrates dense breast tissue but no solid or cystic lesion. Impression  1. BI-RADS category 1, negative. 2. The patient should return at age 35 for bilateral mammography. 3. The palpable abnormality should be managed clinically. 4. She was informed. In accordance with [place] legislation enacted July 2012 (32.1-229 of the Code  of [place]), we have informed this patient by letter that she may have dense  breast tissue. Dense breast tissue can hide cancer or other abnormalities. We  have instructed her to contact her referring physician if she has any questions  or concerns about this report. There are many factors that can increase a  woman's risk of developing breast cancer, including a family history of breast  cancer. A woman's lifetime risk of developing breast cancer can be estimated  using the Breast Cancer Risk Assessment Tool, found on the [employer] website (www.cancer.gov/bcrisktool/).  The addition of breast MRI as a  screening tool may be useful for women with lifetime risk assessments greater  than 20%. No past medical history on file. Past Surgical History:   Procedure Laterality Date    HX BREAST BIOPSY Right      in [place]    HX CHOLECYSTECTOMY      RI BREAST SURGERY PROCEDURE UNLISTED        OB History        4    Para   2    Term   2            AB   2    Living   2       SAB   2    IAB        Ectopic        Molar        Multiple   0    Live Births   2              Family History   Problem Relation Age of Onset    Diabetes Mother      Social History     Tobacco Use    Smoking status: Never Smoker    Smokeless tobacco: Never Used   Substance Use Topics    Alcohol use: Not Currently     Comment: social      Prior to Admission medications    Medication Sig Start Date End Date Taking? Authorizing Provider   ibuprofen (MOTRIN) 800 mg tablet Take 1 Tab by mouth every eight (8) hours. Patient not taking: Reported on 2021   Luis A Mcginnis MD   docusate sodium (COLACE) 50 mg capsule Take 1 Cap by mouth two (2) times a day. Patient not taking: Reported on 2021   Luis A Mcginnis MD   prenatal multivit-ca-min-fe-fa tab Take  by mouth. Patient not taking: Reported on 2022    Provider, Historical      No Known Allergies      Review of Systems   Constitutional: Negative. HENT: Negative. Eyes: Negative. Respiratory: Negative. Cardiovascular: Negative. Gastrointestinal: Negative. Genitourinary: Negative. Musculoskeletal: Negative. Skin: Negative. Neurological: Negative. Endo/Heme/Allergies: Negative. Psychiatric/Behavioral: Negative. Physical Exam  Chest:   Breasts: Breasts are symmetrical.      Right: No inverted nipple, mass (nodular breast tissue UOQ), nipple discharge, skin change, tenderness, axillary adenopathy or supraclavicular adenopathy.       Left: No inverted nipple, mass, nipple discharge, skin change, tenderness, axillary adenopathy or supraclavicular adenopathy. Lymphadenopathy:      Upper Body:      Right upper body: No supraclavicular or axillary adenopathy. Left upper body: No supraclavicular or axillary adenopathy. ASSESSMENT and PLAN    ICD-10-CM ICD-9-CM    1.  Mass of upper outer quadrant of right breast  N63.11 611.72      Total time spent with patient: 20 minutes   RIGHT breast mass is normal dense breast tissue  PRN follow up if mass gets larger or patient feels a new mass  Start annual mammograms age 35

## 2022-06-09 NOTE — LETTER
6/9/2022    Patient: Angi Melo   YOB: 1990   Date of Visit: 6/9/2022     Felicitas Meyers, [address] [place] [id number]  [address] [place] [address]  [address] [place]    Dear Felicitas Meyers DO  [address] [facility],      Thank you for referring Ms. Angi Melo to [address] for evaluation. My notes for this consultation are attached. If you have questions, please do not hesitate to call me. I look forward to following your patient along with you.       Sincerely,    Otilia Brunson MD

## 2022-06-09 NOTE — PATIENT INSTRUCTIONS
Breast Lumps: Care Instructions  Your Care Instructions  Breast lumps are common, especially in women between ages 27 and 48. Many women's breasts feel lumpy and tender before their menstrual period. Women also may have lumps when they are breastfeeding. Breast lumps may go away after menopause. All new breast lumps in women after menopause should be checked by a doctor. Although lumps may be normal for you, it is important to have your doctor check any lump or thickness that is not like the rest of your breast to make sure it is not cancer. A lump may be larger, harder, or different from the rest of your breast tissue. Follow-up care is a key part of your treatment and safety. Be sure to make and go to all appointments, and call your doctor if you are having problems. It's also a good idea to know your test results and keep a list of the medicines you take. How can you care for yourself at home? · Make an appointment to have a mammogram and other follow-up visits as recommended by your doctor. When should you call for help? Watch closely for changes in your health, and be sure to contact your doctor if:    · You do not get better as expected.     · Your breast has changed.     · You have pain in your breast.     · You have a discharge from your nipple.     · A breast lump changes or does not go away. Where can you learn more? Go to http://www.gray.com/  Enter Q928 in the search box to learn more about \"Breast Lumps: Care Instructions. \"  Current as of: November 22, 2021               Content Version: 13.2  © 2006-2022 [employer]. Care instructions adapted under license by [employer] (which disclaims liability or warranty for this information). If you have questions about a medical condition or this instruction, always ask your healthcare professional. [address] any warranty or liability for your use of this information.